# Patient Record
Sex: MALE | Race: BLACK OR AFRICAN AMERICAN | NOT HISPANIC OR LATINO | URBAN - METROPOLITAN AREA
[De-identification: names, ages, dates, MRNs, and addresses within clinical notes are randomized per-mention and may not be internally consistent; named-entity substitution may affect disease eponyms.]

---

## 2022-09-07 ENCOUNTER — HOSPITAL ENCOUNTER (EMERGENCY)
Facility: HOSPITAL | Age: 32
Discharge: HOME | End: 2022-09-07
Attending: EMERGENCY MEDICINE

## 2022-09-07 VITALS
RESPIRATION RATE: 20 BRPM | BODY MASS INDEX: 25.38 KG/M2 | SYSTOLIC BLOOD PRESSURE: 142 MMHG | WEIGHT: 187.39 LBS | DIASTOLIC BLOOD PRESSURE: 89 MMHG | HEIGHT: 72 IN | HEART RATE: 87 BPM | TEMPERATURE: 97.8 F | OXYGEN SATURATION: 99 %

## 2022-09-07 DIAGNOSIS — K08.89 PAIN, DENTAL: Primary | ICD-10-CM

## 2022-09-07 PROCEDURE — 99282 EMERGENCY DEPT VISIT SF MDM: CPT

## 2022-09-07 PROCEDURE — 99282 EMERGENCY DEPT VISIT SF MDM: CPT | Performed by: PHYSICIAN ASSISTANT

## 2022-09-07 NOTE — Clinical Note
Contreras Padmaja was seen and treated in our emergency department on 9/7/2022  Diagnosis:     Yue Woodward  may return to work on return date  He may return on this date: 09/08/2022         If you have any questions or concerns, please don't hesitate to call        Latoya Santiago PA-C    ______________________________           _______________          _______________  Hospital Representative                              Date                                Time
Kamilla Garcia was seen and treated in our emergency department on 9/7/2022  Diagnosis:     Caroline Liu  may return to work on return date  He may return on this date: 09/08/2022         If you have any questions or concerns, please don't hesitate to call        Katheryn Sierra PA-C    ______________________________           _______________          _______________  Hospital Representative                              Date                                Time
Statement Selected
Statement Selected

## 2022-09-07 NOTE — ED PROVIDER NOTES
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 [employer]  Patient doesn't 'have any dental insurance  +smoker  No daily meds except motrin for pain control  None       History reviewed  No pertinent past medical history  History reviewed  No pertinent surgical history  History reviewed  No pertinent family history  I have reviewed and agree with the history as documented  E-Cigarette/Vaping    E-Cigarette Use Never User      E-Cigarette/Vaping Substances     Social History     Tobacco Use    Smoking status: Current Every Day Smoker     Types: Cigarettes   Vaping Use    Vaping Use: Never used   Substance Use Topics    Alcohol use: Never    Drug use: Yes     Types: Marijuana       Review of Systems   Constitutional: Negative for appetite change, fatigue and fever  HENT: Positive for dental problem  Negative for ear pain, hearing loss, sore throat, tinnitus, trouble swallowing and voice change  Eyes: Negative for visual disturbance  Respiratory: Negative for cough and shortness of breath  Gastrointestinal: Negative for abdominal pain and vomiting  Skin: Negative for wound  Allergic/Immunologic: Negative for immunocompromised state  Neurological: Positive for headaches  Negative for dizziness, syncope and weakness  All other systems reviewed and are negative  Physical Exam  Physical Exam  Vitals and nursing note reviewed  Constitutional:       General: He is not in acute distress  Appearance: He is well-developed   He is not ill-appearing, toxic-appearing or diaphoretic  HENT:      Head: Normocephalic and atraumatic  Jaw: There is normal jaw occlusion  No trismus or pain on movement  Right Ear: Tympanic membrane, ear canal and external ear normal       Left Ear: Tympanic membrane, ear canal and external ear normal       Nose: Nose normal       Mouth/Throat:      Lips: Pink  Mouth: Mucous membranes are moist       Dentition: Abnormal dentition  Dental caries present  No gingival swelling, dental abscesses or gum lesions  Tongue: No lesions  Tongue does not deviate from midline  Pharynx: Oropharynx is clear  Uvula midline  No pharyngeal swelling, posterior oropharyngeal erythema or uvula swelling  Tonsils: No tonsillar exudate  0 on the right  0 on the left  Comments: Broken tooth with black dental caries  Lips: slight hypopigmentation lower lip  Normal phonation  Eyes:      Extraocular Movements: Extraocular movements intact  Conjunctiva/sclera: Conjunctivae normal    Cardiovascular:      Rate and Rhythm: Normal rate and regular rhythm  Heart sounds: Normal heart sounds  Pulmonary:      Effort: Pulmonary effort is normal       Breath sounds: Normal breath sounds  Abdominal:      General: Bowel sounds are normal       Palpations: Abdomen is soft  Tenderness: There is no abdominal tenderness  Musculoskeletal:      Cervical back: Normal range of motion and neck supple  Skin:     General: Skin is warm and dry  Neurological:      Mental Status: He is alert and oriented to person, place, and time     Psychiatric:         Behavior: Behavior normal          Vital Signs  ED Triage Vitals [09/07/22 1337]   Temperature Pulse Respirations Blood Pressure SpO2   97 8 °F (36 6 °C) 87 20 142/89 99 %      Temp src Heart Rate Source Patient Position - Orthostatic VS BP Location FiO2 (%)   -- -- -- -- --      Pain Score       3           Vitals:    09/07/22 1337   BP: 142/89   Pulse: 87         Visual Acuity      ED Medications  Medications - No data to display    Diagnostic Studies  Results Reviewed     None                 No orders to display              Procedures  Procedures         ED Course           MDM    Disposition  Final diagnoses:   Pain, dental     Time reflects when diagnosis was documented in both MDM as applicable and the Disposition within this note     Time User Action Codes Description Comment    9/7/2022  3:07 PM Veleta Perches Add [K08 89] Pain, dental     9/7/2022  3:07 PM Veleta Perches Add [R68 84] Jaw pain, non-TMJ     9/7/2022  3:07 PM Veleta Perches Remove [R68 84] Jaw pain, non-TMJ       ED Disposition     ED Disposition   Discharge    Condition   Stable    Date/Time   Wed Sep 7, 2022  3:07 PM    [address] discharge to home/self care  Follow-up Information     Follow up With Specialties Details Why Contact Info Additional [address] and [address]  In 1 week As needed [address] Dentistry In 1 week  [facility] [address]  [address] [place], [place], [address], [phone]          Patient's Medications    No medications on file       No discharge procedures on file      PDMP Review     None          ED Provider  Electronically Signed by           Li Sanon PA-C  09/07/22 0779

## 2022-12-14 ENCOUNTER — HOSPITAL ENCOUNTER (EMERGENCY)
Facility: HOSPITAL | Age: 32
Discharge: HOME | End: 2022-12-14
Attending: EMERGENCY MEDICINE

## 2022-12-14 VITALS
RESPIRATION RATE: 18 BRPM | DIASTOLIC BLOOD PRESSURE: 80 MMHG | HEART RATE: 72 BPM | OXYGEN SATURATION: 98 % | TEMPERATURE: 98.6 F | SYSTOLIC BLOOD PRESSURE: 146 MMHG

## 2022-12-14 DIAGNOSIS — H81.10 BPPV (BENIGN PAROXYSMAL POSITIONAL VERTIGO): Primary | ICD-10-CM

## 2022-12-14 RX ORDER — FLUTICASONE PROPIONATE 50 MCG
1 SPRAY, SUSPENSION (ML) NASAL DAILY
Qty: 16 G | Refills: 0 | Status: SHIPPED | OUTPATIENT
Start: 2022-12-14

## 2022-12-14 RX ORDER — MECLIZINE HYDROCHLORIDE 25 MG/1
25 TABLET ORAL 3 TIMES DAILY PRN
Qty: 30 TABLET | Refills: 0 | Status: SHIPPED | OUTPATIENT
Start: 2022-12-14

## 2022-12-14 RX ORDER — MECLIZINE HYDROCHLORIDE 25 MG/1
25 TABLET ORAL ONCE
Status: COMPLETED | OUTPATIENT
Start: 2022-12-14 | End: 2022-12-14

## 2022-12-14 RX ADMIN — DEXAMETHASONE 10 MG: 2 TABLET ORAL at 14:33

## 2022-12-14 RX ADMIN — MECLIZINE HYDROCHLORIDE 25 MG: 25 TABLET ORAL at 14:15

## 2022-12-14 NOTE — Clinical Note
Baylee Luevano was seen and treated in our emergency department on 12/14/2022  Diagnosis:     Melo Laurent    He may return on this date: 12/17/2022         If you have any questions or concerns, please don't hesitate to call        Sujatha Phillip MD    ______________________________           _______________          _______________  Hospital Representative                              Date                                Time

## 2022-12-14 NOTE — ED ATTENDING ATTESTATION
12/14/2022  INeo MD, saw and evaluated the patient  I have discussed the patient with the resident/non-physician practitioner and agree with the resident's/non-physician practitioner's findings, Plan of Care, and MDM as documented in the resident's/non-physician practitioner's note, except where noted  All available labs and Radiology studies were reviewed  I was present for key portions of any procedure(s) performed by the resident/non-physician practitioner and I was immediately available to provide assistance  At this point I agree with the current assessment done in the Emergency Department  I have conducted an independent evaluation of this patient a history and physical is as follows:    27-year-old man presenting with vertigo which happens only with positional change  No recent illnesses  No headache  Exam was significant for bilateral serous otitis media, no focal neurologic deficit  Will treat symptomatically and refer for ongoing care      ED Course         Critical Care Time  Procedures

## 2022-12-14 NOTE — DISCHARGE INSTRUCTIONS
Take the meclizine and Flonase for your dizziness  You can continue to do the Epley Maneuvers to help your symptoms  If your symptoms do not improve, please follow up with the physical therapists  If you have any numbness, weakness, vision changes, facial droop, slurred speech, or any other new or worsening symptoms, please return to your nearest ER

## 2022-12-14 NOTE — ED PROVIDER NOTES
History  Chief Complaint   Patient presents with   • Medical Problem     Pt stated that he feels like he has a concussion  Denies any head injury or trauma   + dizziness  Normal appetite  - illness recently  + nausea when dizzy     Korina Lujan is a 20-year-old man with no significant medical history presenting with vertigo  Started about 3 days ago  It is improving  It only occurs when he moves his head in certain directions  He gets nauseous when he experiences the vertigo  He states that it feels similar to when he had a concussion before, however reports no recent head strikes  He has not taken anything for the symptoms  No recent illnesses, numbness, weakness, vision changes, fevers, chills  None       History reviewed  No pertinent past medical history  History reviewed  No pertinent surgical history  History reviewed  No pertinent family history  I have reviewed and agree with the history as documented  E-Cigarette/Vaping   • E-Cigarette Use Never User      E-Cigarette/Vaping Substances     Social History     Tobacco Use   • Smoking status: Every Day     Types: Cigarettes   Vaping Use   • Vaping Use: Never used   Substance Use Topics   • Alcohol use: Never   • Drug use: Yes     Types: Marijuana        Review of Systems   All other systems reviewed and are negative  Physical Exam  ED Triage Vitals [12/14/22 1330]   Temperature Pulse Respirations Blood Pressure SpO2   98 6 °F (37 °C) 72 18 146/80 98 %      Temp Source Heart Rate Source Patient Position - Orthostatic VS BP Location FiO2 (%)   Tympanic -- -- -- --      Pain Score       --             Orthostatic Vital Signs  Vitals:    12/14/22 1330   BP: 146/80   Pulse: 72       Physical Exam  Vitals and nursing note reviewed  Constitutional:       General: He is not in acute distress  Appearance: He is well-developed  HENT:      Head: Normocephalic and atraumatic        Right Ear: Tympanic membrane and external ear normal       Left Ear: Tympanic membrane and external ear normal       Nose: Nose normal       Mouth/Throat:      Mouth: Mucous membranes are moist    Eyes:      Extraocular Movements: Extraocular movements intact  Conjunctiva/sclera: Conjunctivae normal       Pupils: Pupils are equal, round, and reactive to light  Cardiovascular:      Rate and Rhythm: Normal rate and regular rhythm  Pulmonary:      Effort: Pulmonary effort is normal  No respiratory distress  Breath sounds: Normal breath sounds  Abdominal:      General: There is no distension  Musculoskeletal:         General: No deformity  Cervical back: Normal range of motion  Skin:     General: Skin is warm and dry  Neurological:      General: No focal deficit present  Mental Status: He is alert and oriented to person, place, and time  Comments: CN2-12 intact  5/5 strength and normal sensation in all 4 extremities  Finger-nose normal   Heel-to-shin normal   Gait normal  Unable to perform HINTS exam as patient is not currently vertiginous  Visual fields intact  ED Medications  Medications   meclizine (ANTIVERT) tablet 25 mg (25 mg Oral Given 12/14/22 1415)   dexamethasone (DECADRON) tablet 10 mg (10 mg Oral Given 12/14/22 1433)       Diagnostic Studies  Results Reviewed     None                 No orders to display         Procedures  Procedures      ED Course                                       MDM  Number of Diagnoses or Management Options  BPPV (benign paroxysmal positional vertigo)  Diagnosis management comments: 34 yo man presenting with vertigo, appears to be BPPV based on history  No other neurological findings to suggest central cause  Will treat with meclizine  Refer to PT  Patient instructed on how to perform Epley maneuvers  Prescribed meclizine for home  Discharged home in stable condition, return precautions given        Disposition  Final diagnoses:   BPPV (benign paroxysmal positional vertigo) Time reflects when diagnosis was documented in both MDM as applicable and the Disposition within this note     Time User Action Codes Description Comment    12/14/2022  2:12 PM Poncho Rival Add [W98 56] BPPV (benign paroxysmal positional vertigo)       ED Disposition     ED Disposition   Discharge    Condition   Stable    Date/Time   Wed Dec 14, 2022  2:09 PM    [address] discharge to home/self care  Follow-up Information     Follow up With Specialties Details Why Contact Info Additional Information    Physical Therapy at [address] [place] [address] Physical Therapy   Aidan [address] 75  [phone] Physical Therapy at [address] [place] [address], [address]    [address] [place] [address] 34 [facility] Emergency Department Emergency Medicine  If symptoms worsen [address] [id number]  [address] [facility] 64 [facility] Emergency Department, [address], [place], [place], [address]          Discharge Medication List as of 12/14/2022  2:23 PM      START taking these medications    Details   fluticasone (FLONASE) 50 mcg/act nasal spray 1 spray into each nostril daily, Starting Wed 12/14/2022, Print      meclizine (ANTIVERT) 25 mg tablet Take 1 tablet (25 mg total) by mouth 3 (three) times a day as needed for dizziness, Starting Wed 12/14/2022, Print               PDMP Review     None           ED Provider  Attending physically available and evaluated Obie Castleman I managed the patient along with the ED Attending      Electronically Signed by         Beto Pereira MD  12/14/22 [address], [place]  12/17/22 0123

## 2023-09-25 ENCOUNTER — HOSPITAL ENCOUNTER (EMERGENCY)
Facility: HOSPITAL | Age: 33
Discharge: HOME | End: 2023-09-25
Attending: EMERGENCY MEDICINE
Payer: COMMERCIAL

## 2023-09-25 VITALS
DIASTOLIC BLOOD PRESSURE: 66 MMHG | OXYGEN SATURATION: 100 % | HEART RATE: 72 BPM | SYSTOLIC BLOOD PRESSURE: 137 MMHG | TEMPERATURE: 97.6 F | RESPIRATION RATE: 16 BRPM

## 2023-09-25 DIAGNOSIS — R21 SKIN RASH: Primary | ICD-10-CM

## 2023-09-25 PROCEDURE — 99284 EMERGENCY DEPT VISIT MOD MDM: CPT | Performed by: EMERGENCY MEDICINE

## 2023-09-25 PROCEDURE — 99282 EMERGENCY DEPT VISIT SF MDM: CPT

## 2023-09-25 RX ORDER — PREDNISONE 20 MG/1
40 TABLET ORAL DAILY
Qty: 10 TABLET | Refills: 0 | Status: SHIPPED | OUTPATIENT
Start: 2023-09-25 | End: 2023-09-30

## 2023-09-25 NOTE — Clinical Note
Lucero Lozanont was seen and treated in our emergency department on 9/25/2023. No restrictions            Diagnosis:     Jovan De La Cruz  may return to work on return date. He may return on this date: 09/26/2023         If you have any questions or concerns, please don't hesitate to call.       Linette Acuña MD    ______________________________           _______________          _______________  Hospital Representative                              Date                                Time

## 2023-09-25 NOTE — ED ATTENDING ATTESTATION
Aubrie Gregorio MD, saw and evaluated the patient. I have discussed the patient with the resident and agree with the resident's findings, Plan of Care, and MDM as documented in the resident's note, except where noted. All available labs and Radiology studies were reviewed. I was present for key portions of any procedure(s) performed by the resident and I was immediately available to provide assistance. At this point I agree with the current assessment done in the Emergency Department. I have conducted an independent evaluation of this patient a history and physical is as follows:    30 yo male with no significant past medical history presents to the ED complaining of a diffuse rash x 4-5 months. The patient says he developed the rash in the late spring/early summer. He was evaluated at an outside hospital and diagnosed with eczema. He was prescribed a topical low-dose steroid but hasn't experienced much relief. (+) Scattered circular pruritic erythematous/scaling lesions to all 4 extremities and scalp. The rash is worsened by wearing tight closes. No fevers or chills. He has not seen a dermatologist and does not have a PCP. No chest pain, shortness of breath, cough, nausea, or vomiting. No other specific complaints. ROS: per resident physician note    Gen: NAD, AA&Ox3  HEENT: PERRL, EOMI  Neck: supple  CV: RRR  Lungs: CTA B/L  Abdomen: soft, NT/ND  Ext: no swelling or deformity  Neuro: 5/5 strength all extremities, sensation grossly intact  Skin: (+) small erythematous circular plaques with white scaling to extremities and scalp, no palm/sole involvement, no mm lesions    ED Course  The patient is comfortable appearing with stable vital signs. Rash has been present for 4 months and has not significantly worsened. Psoriasis vs eczema vs contact dermatitis? Plan for a course of oral steroids and close follow up with Dermatology for further workup/management. Ambulatory referral to Dermatology entered. The patient is agreeable to this plan. Strict return precautions and work note provided.       Critical Care Time  Procedures

## 2023-09-26 NOTE — ED PROVIDER NOTES
Patient is a 69-year-old male history  Chief Complaint   Patient presents with   • Skin Problem     C/o of eczema like patches all over his body, started beginning of the summer. Went to [employer] and was diagnosed with eczema     Patient is an otherwise healthy 69-year-old male who presents for evaluation of worsening rash. Patient states that symptoms began over the summer. He was seen and evaluated at [facility] (unable to confirm in chart) and diagnosed with eczema. He has had progressive worsening of itching, and spread of rash throughout entire body surface. Patient states he has been utilizing topical steroid cream without improvement of symptoms. Patient states that rash is irritated by wearing his work uniform, and often skin sticks to his uniform. Upon removing uniform, patient notices bleeding at sites of affected skin. Denying any other complaints at this time. Has not yet been seen by a dermatologist for this issue. Prior to Admission Medications   Prescriptions Last Dose Informant Patient Reported? Taking?   fluticasone (FLONASE) 50 mcg/act nasal spray   No No   Si spray into each nostril daily   meclizine (ANTIVERT) 25 mg tablet   No No   Sig: Take 1 tablet (25 mg total) by mouth 3 (three) times a day as needed for dizziness      Facility-Administered Medications: None       History reviewed. No pertinent past medical history. History reviewed. No pertinent surgical history. History reviewed. No pertinent family history. I have reviewed and agree with the history as documented. E-Cigarette/Vaping   • E-Cigarette Use Never User      E-Cigarette/Vaping Substances     Social History     Tobacco Use   • Smoking status: Every Day     Types: Cigarettes   Vaping Use   • Vaping Use: Never used   Substance Use Topics   • Alcohol use: Never   • Drug use: Yes     Types: Marijuana        Review of Systems   Constitutional: Negative for chills and fever.    Respiratory: Negative for shortness of breath. Cardiovascular: Negative for chest pain. Gastrointestinal: Negative for abdominal pain, nausea and vomiting. Genitourinary: Negative for difficulty urinating. Skin: Positive for rash. Neurological: Negative for headaches. Physical Exam  ED Triage Vitals [09/25/23 1646]   Temperature Pulse Respirations Blood Pressure SpO2   97.6 °F (36.4 °C) 72 16 137/66 100 %      Temp Source Heart Rate Source Patient Position - Orthostatic VS BP Location FiO2 (%)   Temporal Monitor Sitting Left arm --      Pain Score       No Pain             Orthostatic Vital Signs  Vitals:    09/25/23 1646   BP: 137/66   Pulse: 72   Patient Position - Orthostatic VS: Sitting       Physical Exam  Constitutional:       General: He is not in acute distress. Appearance: Normal appearance. He is normal weight. He is not ill-appearing, toxic-appearing or diaphoretic. HENT:      Head: Normocephalic and atraumatic. Cardiovascular:      Rate and Rhythm: Normal rate and regular rhythm. Heart sounds: Normal heart sounds. Pulmonary:      Effort: Pulmonary effort is normal.      Breath sounds: Normal breath sounds. Abdominal:      General: Abdomen is flat. Palpations: Abdomen is soft. Tenderness: There is no abdominal tenderness. Musculoskeletal:         General: Normal range of motion. Skin:     General: Skin is warm and dry. Capillary Refill: Capillary refill takes less than 2 seconds. Findings: Rash (small patches and plaques observed over entire body surface including scalp. Sparing palms. ) present. Neurological:      General: No focal deficit present. Mental Status: He is alert. Psychiatric:         Mood and Affect: Mood normal.         Behavior: Behavior normal.         Thought Content:  Thought content normal.         Judgment: Judgment normal.         ED Medications  Medications - No data to display    Diagnostic Studies  Results Reviewed     None No orders to display         Procedures  Procedures      ED Course                             SBIRT 20yo+    Flowsheet Row Most Recent Value   Initial Alcohol Screen: US AUDIT-C     1. How often do you have a drink containing alcohol? 0 Filed at: 09/25/2023 1924   2. How many drinks containing alcohol do you have on a typical day you are drinking? 0 Filed at: 09/25/2023 1924   3a. Male UNDER 65: How often do you have five or more drinks on one occasion? 0 Filed at: 09/25/2023 1924   Audit-C Score 0 Filed at: 09/25/2023 1924   JUNO: How many times in the past year have you. .. Used an illegal drug or used a prescription medication for non-medical reasons? Never Filed at: 09/25/2023 [address] Alex Capps is a 29 y.o. who presents with complaints of worsening skin rash. Diagnosed with eczema this summer at [facility]. Vital signs are stable, afebrile  PE: diffuse scales and plaques observed on body surface, see media for image of rash     Ddx: psoriasis vs. Eczema vs. Other dermatologic condition    Plan: Will provide dermatology referral and 5 day course of prednisone    Disposition: Patient stable for discharge home. Return precautions provided. Patient understands and agreeable to plan. Risk  Prescription drug management. Disposition  Final diagnoses:   Skin rash     Time reflects when diagnosis was documented in both MDM as applicable and the Disposition within this note     Time User Action Codes Description Comment    9/25/2023  7:14 PM Phu Osorio Add [R21] Skin rash       ED Disposition     ED Disposition   Discharge    Condition   Stable    Date/Time   Mon Sep 25, 2023  7:14 PM    [address] [facility] discharge to home/self care.                Follow-up Information     Follow up With Specialties Details Why [address] [facility] Internal Medicine Schedule an appointment as soon as possible for a visit in 1 month to establish care [address] [facility] [address] 5446 [facility], [address] [place] [address], [place], [place], [address] [phone]          Discharge Medication List as of 9/25/2023  7:16 PM      START taking these medications    Details   predniSONE 20 mg tablet Take 2 tablets (40 mg total) by mouth daily for 5 days, Starting Mon 9/25/2023, Until Sat 9/30/2023, Print         CONTINUE these medications which have NOT CHANGED    Details   fluticasone (FLONASE) 50 mcg/act nasal spray 1 spray into each nostril daily, Starting Wed 12/14/2022, Print      meclizine (ANTIVERT) 25 mg tablet Take 1 tablet (25 mg total) by mouth 3 (three) times a day as needed for dizziness, Starting Wed 12/14/2022, Print               PDMP Review     None           ED Provider  Attending physically available and evaluated Barbara Rachel. I managed the patient along with the ED Attending.     Electronically Signed by         Jose Rao MD  09/26/23 3090

## 2023-10-24 ENCOUNTER — APPOINTMENT (EMERGENCY)
Dept: RADIOLOGY | Facility: HOSPITAL | Age: 33
End: 2023-10-24
Payer: OTHER MISCELLANEOUS

## 2023-10-24 ENCOUNTER — HOSPITAL ENCOUNTER (EMERGENCY)
Facility: HOSPITAL | Age: 33
Discharge: HOME | End: 2023-10-24
Attending: EMERGENCY MEDICINE
Payer: OTHER MISCELLANEOUS

## 2023-10-24 VITALS
RESPIRATION RATE: 18 BRPM | WEIGHT: 182 LBS | HEART RATE: 71 BPM | OXYGEN SATURATION: 100 % | HEIGHT: 72 IN | DIASTOLIC BLOOD PRESSURE: 91 MMHG | SYSTOLIC BLOOD PRESSURE: 142 MMHG | BODY MASS INDEX: 24.65 KG/M2 | TEMPERATURE: 98.2 F

## 2023-10-24 DIAGNOSIS — S61.209A AVULSION OF FINGERTIP, INITIAL ENCOUNTER: Primary | ICD-10-CM

## 2023-10-24 DIAGNOSIS — S61.217A LACERATION OF LEFT LITTLE FINGER: ICD-10-CM

## 2023-10-24 PROCEDURE — 73140 X-RAY EXAM OF FINGER(S): CPT

## 2023-10-24 PROCEDURE — 90471 IMMUNIZATION ADMIN: CPT

## 2023-10-24 PROCEDURE — 99283 EMERGENCY DEPT VISIT LOW MDM: CPT

## 2023-10-24 PROCEDURE — 90715 TDAP VACCINE 7 YRS/> IM: CPT | Performed by: EMERGENCY MEDICINE

## 2023-10-24 PROCEDURE — 99285 EMERGENCY DEPT VISIT HI MDM: CPT | Performed by: EMERGENCY MEDICINE

## 2023-10-24 PROCEDURE — 64450 NJX AA&/STRD OTHER PN/BRANCH: CPT | Performed by: EMERGENCY MEDICINE

## 2023-10-24 RX ORDER — CEPHALEXIN 250 MG/1
500 CAPSULE ORAL ONCE
Status: COMPLETED | OUTPATIENT
Start: 2023-10-24 | End: 2023-10-24

## 2023-10-24 RX ORDER — OXYCODONE HYDROCHLORIDE 5 MG/1
5 TABLET ORAL EVERY 6 HOURS PRN
Qty: 5 TABLET | Refills: 0 | Status: SHIPPED | OUTPATIENT
Start: 2023-10-24

## 2023-10-24 RX ORDER — IBUPROFEN 600 MG/1
600 TABLET ORAL EVERY 6 HOURS PRN
Qty: 30 TABLET | Refills: 0 | Status: SHIPPED | OUTPATIENT
Start: 2023-10-24

## 2023-10-24 RX ORDER — CEPHALEXIN 500 MG/1
500 CAPSULE ORAL EVERY 6 HOURS SCHEDULED
Qty: 28 CAPSULE | Refills: 0 | Status: SHIPPED | OUTPATIENT
Start: 2023-10-24 | End: 2023-10-31

## 2023-10-24 RX ORDER — LIDOCAINE HYDROCHLORIDE 20 MG/ML
5 INJECTION, SOLUTION EPIDURAL; INFILTRATION; INTRACAUDAL; PERINEURAL ONCE
Status: COMPLETED | OUTPATIENT
Start: 2023-10-24 | End: 2023-10-24

## 2023-10-24 RX ADMIN — CEPHALEXIN 500 MG: 250 CAPSULE ORAL at 21:18

## 2023-10-24 RX ADMIN — TETANUS TOXOID, REDUCED DIPHTHERIA TOXOID AND ACELLULAR PERTUSSIS VACCINE, ADSORBED 0.5 ML: 5; 2.5; 8; 8; 2.5 SUSPENSION INTRAMUSCULAR at 20:19

## 2023-10-24 RX ADMIN — LIDOCAINE HYDROCHLORIDE 5 ML: 20 INJECTION, SOLUTION EPIDURAL; INFILTRATION; INTRACAUDAL; PERINEURAL at 19:59

## 2023-10-24 NOTE — ED PROVIDER NOTES
History  Chief Complaint   Patient presents with    Finger Laceration     Left 5th digit laceration that occurred when a rack holding metal parts fell on patient's hand around 7 pm tonight     59-year-old male presents to the emergency department for evaluation of a finger laceration. The patient reports just prior to arrival he was at work and a metal rack fell cutting his left fifth digit. The patient states that the laceration is to the tip of his left little finger and he immediately started bleeding. He reports putting pressure on the area and having a supervisor from work drive him here to the emergency department for further evaluation. The patient denies any other injuries. He is unsure of when his last tetanus immunization was. He denies any localized numbness, tingling or weakness. None       History reviewed. No pertinent past medical history. History reviewed. No pertinent surgical history. History reviewed. No pertinent family history. I have reviewed and agree with the history as documented. E-Cigarette/Vaping    E-Cigarette Use Never User      E-Cigarette/Vaping Substances     Social History     Tobacco Use    Smoking status: Every Day     Packs/day: 0.25     Types: Cigarettes   Vaping Use    Vaping Use: Never used   Substance Use Topics    Alcohol use: Never    Drug use: Yes     Types: Marijuana       Review of Systems   Constitutional:  Negative for chills and fever. HENT:  Negative for ear pain and sore throat. Eyes:  Negative for pain and visual disturbance. Respiratory:  Negative for cough and shortness of breath. Cardiovascular:  Negative for chest pain and palpitations. Gastrointestinal:  Negative for abdominal pain and vomiting. Genitourinary:  Negative for dysuria and hematuria. Musculoskeletal:  Negative for arthralgias and back pain. Skin:  Positive for wound. Negative for color change and rash. Neurological:  Negative for seizures and syncope. All other systems reviewed and are negative. Physical Exam  Physical Exam  Vitals and nursing note reviewed. Constitutional:       General: He is not in acute distress. Appearance: He is well-developed. HENT:      Head: Normocephalic and atraumatic. Eyes:      Conjunctiva/sclera: Conjunctivae normal.   Cardiovascular:      Rate and Rhythm: Normal rate and regular rhythm. Heart sounds: No murmur heard. Pulmonary:      Effort: Pulmonary effort is normal. No respiratory distress. Breath sounds: Normal breath sounds. Abdominal:      Palpations: Abdomen is soft. Tenderness: There is no abdominal tenderness. Musculoskeletal:         General: Tenderness, deformity and signs of injury present. Right wrist: Normal.      Left wrist: Normal.      Right hand: Normal.      Left hand: Laceration present. Hands:       Cervical back: Neck supple. Skin:     General: Skin is warm and dry. Capillary Refill: Capillary refill takes less than 2 seconds. Findings: Laceration present. Neurological:      Mental Status: He is alert.    Psychiatric:         Mood and Affect: Mood normal.             Vital Signs  ED Triage Vitals   Temperature Pulse Respirations Blood Pressure SpO2   10/24/23 1942 10/24/23 1935 10/24/23 1935 10/24/23 1935 10/24/23 1935   98.2 °F (36.8 °C) 71 18 142/91 100 %      Temp Source Heart Rate Source Patient Position - Orthostatic VS BP Location FiO2 (%)   10/24/23 1942 10/24/23 1935 10/24/23 1935 10/24/23 1935 --   Oral Monitor Sitting Right arm       Pain Score       10/24/23 1935       2           Vitals:    10/24/23 1935   BP: 142/91   Pulse: 71   Patient Position - Orthostatic VS: Sitting         Visual Acuity      ED Medications  Medications   lidocaine (PF) (XYLOCAINE-MPF) 2 % injection 5 mL (5 mL Infiltration Given 10/24/23 1959)   tetanus-diphtheria-acellular pertussis (BOOSTRIX) IM injection 0.5 mL (0.5 mL Intramuscular Given 10/24/23 2019) cephalexin (KEFLEX) capsule 500 mg (500 mg Oral Given 10/24/23 2118)       Diagnostic Studies  Results Reviewed       None                   XR finger fifth digit-pinkie LEFT   ED Interpretation by Rosalio Nesbitt MD (10/24 2028)   Left fifth digit fingertip avulsion injury with fracture                 Procedures  Digital Block    Date/Time: 10/24/2023 8:30 PM    Performed by: Rosalio Nesbitt MD  Authorized by: Rosalio Nesbitt MD    Consent:     Consent obtained:  Verbal    Consent given by:  Patient    Risks discussed: Allergic reaction, infection, nerve damage, bleeding, intravascular injection, pain, unsuccessful block and swelling    Alternatives discussed:  No treatment  Indications:     Indications:  Pain relief  Location:     Block location:  Finger    Finger blocked:  L little finger  Pre-procedure details:     Neurovascular status: intact      Skin preparation:  Alcohol  Procedure details (see MAR for exact dosages):     Needle gauge:  25 G    Anesthetic injected:  Lidocaine 2% w/o epi    Technique:  Four-sided ring block    Injection procedure:  Anatomic landmarks identified, incremental injection, negative aspiration for blood, anatomic landmarks palpated and introduced needle  Post-procedure details:     Outcome:  Pain relieved    Patient tolerance of procedure: Tolerated well, no immediate complications           ED Course  ED Course as of 10/25/23 0018   e Oct 24, 2023   2025 Tiger text sent to the on-call hand surgeon, Dr. Corinne Fallow. 2032 In addition to the tetanus shot and bulky dressing that was already applied the orthopedic hand surgeon recommended starting the patient on a course of Keflex and having the patient follow-up with hand surgery. SBIRT 22yo+      Flowsheet Row Most Recent Value   Initial Alcohol Screen: US AUDIT-C     1.  How often do you have a drink containing alcohol? 0 Filed at: 10/24/2023 1938   Audit-C Score 0 Filed at: 10/24/2023 4917 JUNO: How many times in the past year have you. .. Used an illegal drug or used a prescription medication for non-medical reasons? Never Filed at: 10/24/2023 1938                      Medical Decision Making  56-year-old male presented to the emergency department for evaluation of a finger laceration. Physical exam was consistent with a left fifth finger fingertip avulsion injury. Imaging done in the emergency department on my interpretation was consistent with an avulsion injury with fracture. The case was discussed with the orthopedic hand surgeon on-call who recommended starting the patient on a course of Keflex and having him follow-up with hand surgery outpatient. A digital block was performed and the patient was placed in a bulky dressing. Bleeding was controlled. The patient was given a tetanus shot and provided with his first dose of Keflex here in the emergency department and he was given a prescription for a 7-day course. The patient was also given a prescription for Motrin and oxycodone. An ambulatory referral to orthopedic hand surgery was placed. The patient was provided with follow-up information. Patient agrees with the plan for discharge and feels comfortable to go home with proper f/u. Advised to return for worsening or additional problems. Diagnostic tests were reviewed and questions answered. Diagnosis, care plan and treatment options were discussed. The patient understands instructions and will follow up as directed. Amount and/or Complexity of Data Reviewed  Radiology: ordered and independent interpretation performed. Risk  Prescription drug management.              Disposition  Final diagnoses:   Avulsion of fingertip, initial encounter   Laceration of left little finger     Time reflects when diagnosis was documented in both MDM as applicable and the Disposition within this note       Time User Action Codes Description Comment    10/24/2023  9:06 PM Mayme Krabbe Add [S61.209A] Avulsion of fingertip, initial encounter     10/24/2023  9:06 PM Aleene Memory Add [V99.615R] Laceration of left little finger           ED Disposition       ED Disposition   Discharge    Condition   Stable    Date/Time   Tue Oct 24, 2023 2106    Comment   Roberto Carlos Quivers discharge to home/self care.                    Follow-up Information       Follow up With Specialties Details Why Contact Info Additional Information    Carroll Ambrose MD Orthopedic Surgery, Hand Surgery Schedule an appointment as soon as possible for a visit   [address] [phone]       [address] [facility] Emergency Department Emergency Medicine Go to  If symptoms worsen [address] [phone]  [address] [facility] Emergency Department, [address] [place] [address], [address] [place] [address]            Discharge Medication List as of 10/24/2023  9:11 PM        START taking these medications    Details   cephalexin (KEFLEX) 500 mg capsule Take 1 capsule (500 mg total) by mouth every 6 (six) hours for 7 days, Starting Tue 10/24/2023, Until Tue 10/31/2023, Normal      ibuprofen (MOTRIN) 600 mg tablet Take 1 tablet (600 mg total) by mouth every 6 (six) hours as needed for mild pain or moderate pain, Starting Tue 10/24/2023, Normal      oxyCODONE (Roxicodone) 5 immediate release tablet Take 1 tablet (5 mg total) by mouth every 6 (six) hours as needed for moderate pain for up to 5 doses Max Daily Amount: 20 mg, Starting Tue 10/24/2023, Normal                 PDMP Review       None            ED Provider  Electronically Signed by             Noman Rosenbaum MD  10/25/23 0056

## 2023-10-25 ENCOUNTER — TELEPHONE (OUTPATIENT)
Dept: OBGYN CLINIC | Facility: CLINIC | Age: 33
End: 2023-10-25

## 2023-10-25 NOTE — TELEPHONE ENCOUNTER
----- Message from John Stephens MD sent at 10/24/2023  8:34 PM EDT -----  Please add on with hand surgery for finger tip amputation. If seeing me, please schedule before Tuesday morning. Thank you.

## 2023-10-26 NOTE — TELEPHONE ENCOUNTER
Caller: Wife    Doctor: Hand    Reason for call: Spoke with Chacha Barragan and she will add him to 10/30/2023.      Call back#: n/a

## 2023-10-27 ENCOUNTER — HOSPITAL ENCOUNTER (EMERGENCY)
Facility: HOSPITAL | Age: 33
Discharge: HOME | End: 2023-10-27
Attending: EMERGENCY MEDICINE
Payer: OTHER MISCELLANEOUS

## 2023-10-27 ENCOUNTER — TELEPHONE (OUTPATIENT)
Dept: OBGYN CLINIC | Facility: CLINIC | Age: 33
End: 2023-10-27

## 2023-10-27 VITALS
SYSTOLIC BLOOD PRESSURE: 143 MMHG | OXYGEN SATURATION: 98 % | TEMPERATURE: 97 F | DIASTOLIC BLOOD PRESSURE: 91 MMHG | RESPIRATION RATE: 17 BRPM | HEART RATE: 79 BPM

## 2023-10-27 DIAGNOSIS — K08.89 PAIN, DENTAL: ICD-10-CM

## 2023-10-27 DIAGNOSIS — S69.90XA FINGER INJURY: ICD-10-CM

## 2023-10-27 DIAGNOSIS — S62.639A CLOSED FRACTURE OF TUFT OF DISTAL PHALANX OF FINGER: Primary | ICD-10-CM

## 2023-10-27 PROCEDURE — 99284 EMERGENCY DEPT VISIT MOD MDM: CPT | Performed by: EMERGENCY MEDICINE

## 2023-10-27 PROCEDURE — 99282 EMERGENCY DEPT VISIT SF MDM: CPT

## 2023-10-27 RX ORDER — OXYCODONE HYDROCHLORIDE 5 MG/1
5 TABLET ORAL EVERY 4 HOURS PRN
Qty: 6 TABLET | Refills: 0 | Status: SHIPPED | OUTPATIENT
Start: 2023-10-27

## 2023-10-27 NOTE — ED ATTENDING ATTESTATION
10/27/2023  IJaspal MD, saw and evaluated the patient. I have discussed the patient with the resident/non-physician practitioner and agree with the resident's/non-physician practitioner's findings, Plan of Care, and MDM as documented in the resident's/non-physician practitioner's note, except where noted. All available labs and Radiology studies were reviewed. I was present for key portions of any procedure(s) performed by the resident/non-physician practitioner and I was immediately available to provide assistance. At this point I agree with the current assessment done in the Emergency Department. I have conducted an independent evaluation of this patient a history and physical is as follows:    ED Course         Critical Care Time  Procedures    28 yo male crushed left fifth digit in a machine while at work few days ago, seen in [place] ed and given follow up, but not allowed to see due to work restrictions. Pt with continued pain in finger. Pt also with bottom left tooth pain. Pt taking ibuprofen. No facial swelling. No fever. Vss, afebrile, lungs cta, rrr, abdomen soft nontender, no facial swelling, tooth 18 cracked, tenderness, no abscess seen or felt. Pt finger wrapped and nvi.

## 2023-10-27 NOTE — TELEPHONE ENCOUNTER
Left voice mail message asking for patient to call back. Unable to leave message on [facility] voice mail is full. Need workman's comp information.

## 2023-10-27 NOTE — DISCHARGE INSTRUCTIONS
Please follow-up with hand surgery and dental clinic. Please follow-up with PCP and return to the ED with new or worsening symptoms-see attached. Pain medication was sent to your pharmacy. Continue taking Tylenol/Motrin for pain relief as well. Continue taking your antibiotic as prescribed.

## 2023-10-27 NOTE — ED PROVIDER NOTES
History  Chief Complaint   Patient presents with    Dental Pain     Pt reports left lower tooth abscess since Tuesday. Complains of pain at the site and sore throat. Pt taking ibuprofen for pain. Denies ABX use     Finger Injury     Pt injured right hand a few days ago and has not had follow up for it due to job discrepancy. Pt is here for follow up and ongoing pain issues with hand      Patient is a 36-year-old male presenting with finger pain and tooth pain. Patient states that he crushed his finger at work the other day and was told not to go to the hand surgeon by his job, so he ran out of pain medication and wanted his finger looked at for infection. Patient states that his finger he can wash the is in pain, but he denies any issues with it otherwise. Patient's main concern today was. Tooth pain. He states that he has a chronically cracked tooth that started hurting return in 3 days ago. He states that he had abscesses in his teeth previously to and it felt similar to this. He states that skin around the pain in the tooth has been progressively getting worse and he feels like his cheek is swollen. Patient denies fever, chills, diaphoresis. Patient denies any other symptoms including headache, lightheadedness, chest pain, shortness of breath, abdominal pain, nausea, vomiting, diarrhea, numbness, tingling, or weakness. Prior to Admission Medications   Prescriptions Last Dose Informant Patient Reported? Taking?    cephalexin (KEFLEX) 500 mg capsule   No No   Sig: Take 1 capsule (500 mg total) by mouth every 6 (six) hours for 7 days   ibuprofen (MOTRIN) 600 mg tablet   No No   Sig: Take 1 tablet (600 mg total) by mouth every 6 (six) hours as needed for mild pain or moderate pain   oxyCODONE (Roxicodone) 5 immediate release tablet   No No   Sig: Take 1 tablet (5 mg total) by mouth every 6 (six) hours as needed for moderate pain for up to 5 doses Max Daily Amount: 20 mg      Facility-Administered Medications: None       History reviewed. No pertinent past medical history. History reviewed. No pertinent surgical history. History reviewed. No pertinent family history. I have reviewed and agree with the history as documented. E-Cigarette/Vaping    E-Cigarette Use Never User      E-Cigarette/Vaping Substances     Social History     Tobacco Use    Smoking status: Former     Packs/day: 0.25     Types: Cigars, Cigarettes   Vaping Use    Vaping Use: Never used   Substance Use Topics    Alcohol use: Never    Drug use: Not Currently     Types: Marijuana        Review of Systems    Physical Exam  ED Triage Vitals [10/27/23 1246]   Temperature Pulse Respirations Blood Pressure SpO2   (!) 97 °F (36.1 °C) 79 17 143/91 98 %      Temp Source Heart Rate Source Patient Position - Orthostatic VS BP Location FiO2 (%)   Temporal Monitor -- Left arm --      Pain Score       7             Orthostatic Vital Signs  Vitals:    10/27/23 1246   BP: 143/91   Pulse: 79       Physical Exam  Vitals and nursing note reviewed. Constitutional:       General: He is not in acute distress. Appearance: Normal appearance. He is not ill-appearing, toxic-appearing or diaphoretic. HENT:      Head: Normocephalic and atraumatic. Mouth/Throat:      Mouth: Mucous membranes are moist.        Comments: Dental caries throughout the mouth  Cardiovascular:      Rate and Rhythm: Normal rate and regular rhythm. Heart sounds: Normal heart sounds. Pulmonary:      Effort: Pulmonary effort is normal. No respiratory distress. Breath sounds: Normal breath sounds. Abdominal:      General: Abdomen is flat. Palpations: Abdomen is soft. Musculoskeletal:         General: Signs of injury (Avulsion left fifth fingertip. No signs of infection) present. Normal range of motion. Cervical back: Normal range of motion and neck supple. Skin:     General: Skin is warm and dry. Findings: Rash (Diffuse psoriasis) present. Neurological:      General: No focal deficit present. Mental Status: He is alert and oriented to person, place, and time. ED Medications  Medications - No data to display    Diagnostic Studies  Results Reviewed       None                   No orders to display         Procedures  Procedures      ED Course                                       Medical Decision Making  Patient is a 78-year-old male presenting with finger pain and tooth pain. Patient was previously seen in the emergency department for a finger injury, bandage was removed to look for signs of infection which were not seen. New bandage applied. Patient complains of significant pain, so a few oxycodone were given to cover him for the weekend until he can follow-up with hand surgery. Patient complaining of tooth pain and has poor oral hygiene with multiple dental caries leading to concern for infection. Patient is already taking Keflex for his finger, so no new antibiotics were given for his tooth. No drainable abscess visible at this time. Patient was cleared for discharge with PCP and dental follow-up, and he was given return precautions. Risk  Prescription drug management. Disposition  Final diagnoses:   Closed fracture of tuft of distal phalanx of finger   Pain, dental   Finger injury     Time reflects when diagnosis was documented in both MDM as applicable and the Disposition within this note       Time User Action Codes Description Comment    10/27/2023  1:46 PM Dayana Terrazas Add [R55.825J] Closed fracture of tuft of distal phalanx of finger     10/27/2023  2:16 PM Sherry Subramanian Add [K08.89] Pain, dental     10/27/2023  2:16 PM Sherry Subramanian Add [S69.90XA] Finger injury           ED Disposition       ED Disposition   Discharge    Condition   Stable    Date/Time   Fri Oct 27, 2023  2:16 PM    [address] [place] [address] discharge to home/self care.                    Follow-up Information    None Discharge Medication List as of 10/27/2023  2:18 PM        START taking these medications    Details   !! oxyCODONE (Roxicodone) 5 immediate release tablet Take 1 tablet (5 mg total) by mouth every 4 (four) hours as needed for moderate pain Max Daily Amount: 30 mg, Starting Fri 10/27/2023, Normal       !! - Potential duplicate medications found. Please discuss with provider. CONTINUE these medications which have NOT CHANGED    Details   cephalexin (KEFLEX) 500 mg capsule Take 1 capsule (500 mg total) by mouth every 6 (six) hours for 7 days, Starting Tue 10/24/2023, Until Tue 10/31/2023, Normal      ibuprofen (MOTRIN) 600 mg tablet Take 1 tablet (600 mg total) by mouth every 6 (six) hours as needed for mild pain or moderate pain, Starting Tue 10/24/2023, Normal      !! oxyCODONE (Roxicodone) 5 immediate release tablet Take 1 tablet (5 mg total) by mouth every 6 (six) hours as needed for moderate pain for up to 5 doses Max Daily Amount: 20 mg, Starting Tue 10/24/2023, Normal       !! - Potential duplicate medications found. Please discuss with provider. PDMP Review       None             ED Provider  Attending physically available and evaluated Fadumo Katz. I managed the patient along with the ED Attending.     Electronically Signed by           Carrie Whitfield MD  10/29/23 6667

## 2023-10-31 ENCOUNTER — OFFICE VISIT (OUTPATIENT)
Dept: OBGYN CLINIC | Facility: CLINIC | Age: 33
End: 2023-10-31
Payer: OTHER MISCELLANEOUS

## 2023-10-31 ENCOUNTER — TELEPHONE (OUTPATIENT)
Dept: OBGYN CLINIC | Facility: CLINIC | Age: 33
End: 2023-10-31

## 2023-10-31 ENCOUNTER — OFFICE VISIT (OUTPATIENT)
Dept: OCCUPATIONAL THERAPY | Facility: CLINIC | Age: 33
End: 2023-10-31
Payer: OTHER MISCELLANEOUS

## 2023-10-31 VITALS
BODY MASS INDEX: 24.65 KG/M2 | HEIGHT: 72 IN | DIASTOLIC BLOOD PRESSURE: 70 MMHG | SYSTOLIC BLOOD PRESSURE: 150 MMHG | WEIGHT: 182 LBS

## 2023-10-31 DIAGNOSIS — S61.209A AVULSION OF FINGERTIP, INITIAL ENCOUNTER: ICD-10-CM

## 2023-10-31 DIAGNOSIS — S61.209S: Primary | ICD-10-CM

## 2023-10-31 PROCEDURE — L3933 FO W/O JOINTS CF: HCPCS | Performed by: OCCUPATIONAL THERAPIST

## 2023-10-31 PROCEDURE — 99213 OFFICE O/P EST LOW 20 MIN: CPT | Performed by: ORTHOPAEDIC SURGERY

## 2023-10-31 NOTE — TELEPHONE ENCOUNTER
Jose Parekh from [facility] adjustor requested today's office note, script and letter be faxed to [phone].  Faxed information received confirmation

## 2023-10-31 NOTE — PROGRESS NOTES
Assessment/Plan:  1. Avulsion of fingertip, initial encounter  Ambulatory Referral to Hand Surgery          Subjective history, physical examination performed, diagnostic imaging reviewed at today's visit  Diagnosis was discussed  Treatment options were discussed which included nonoperative and operative treatment. Risks, benefits, and realistic expectations for treatment options were discussed. The patient was given an opportunity to ask questions. Questions were answered to the patient's satisfaction. Through shared decision making, the patient decided to move forward with  local wound care and protective splint fabricated by hand therapist    Work restrictions:  must wear splint. No lifting more than 5 lbs with left hand. Keep finger clean and dry  Follow up in 2 weeks for clinical evaluation    Orthopedic Hand Surgery Sterile Saline Soak Directions  Perform hand soak three times daily for 10-15 minutes    Fill a container with enough warm water to cover your wound  Add one pump of antibacterial soap that you use to wash your hands  While soaking, you should exercise and move your fingers if your therapist has instructed you to do so  Throw away solution after soaking your hand, and make up fresh solution for each new soak  Pat wound/incision dry and re-dress if instructed by physician to keep the area covered           cc: I injured by fingertip    Subjective:   Arin Garcia is a right hand dominant 29 y.o. male  who presents for evaluation and treatment of a crush injury to the left small finger tip on 10/24/23 while at work. A metal plate came down on his finger. He went to the ED on 10/24/2023. X-rays were taken. He stated that he has been out of work since the injury. History reviewed. No pertinent past medical history. History reviewed. No pertinent surgical history. History reviewed. No pertinent family history.     Social History     Occupational History Not on file   Tobacco Use    Smoking status: Every Day     Packs/day: 0.25     Types: Cigars, Cigarettes    Smokeless tobacco: Not on file   Vaping Use    Vaping Use: Never used   Substance and Sexual Activity    Alcohol use: Never    Drug use: Not Currently     Types: Marijuana    Sexual activity: Not on file         Current Outpatient Medications:     cephalexin (KEFLEX) 500 mg capsule, Take 1 capsule (500 mg total) by mouth every 6 (six) hours for 7 days, Disp: 28 capsule, Rfl: 0    ibuprofen (MOTRIN) 600 mg tablet, Take 1 tablet (600 mg total) by mouth every 6 (six) hours as needed for mild pain or moderate pain, Disp: 30 tablet, Rfl: 0    oxyCODONE (Roxicodone) 5 immediate release tablet, Take 1 tablet (5 mg total) by mouth every 6 (six) hours as needed for moderate pain for up to 5 doses Max Daily Amount: 20 mg, Disp: 5 tablet, Rfl: 0    oxyCODONE (Roxicodone) 5 immediate release tablet, Take 1 tablet (5 mg total) by mouth every 4 (four) hours as needed for moderate pain Max Daily Amount: 30 mg, Disp: 6 tablet, Rfl: 0    Allergies   Allergen Reactions    Penicillins Rash       Objective:  Vitals:    10/31/23 1407   BP: 150/70       The patient was awake, alert, and oriented to person, place, and time. No acute distress. Normocephalic. EOMI. Mucous membranes moist.  Normal respiratory effort. Examination of the left small finger was performed. There is a nail plate avulsion. There is residual nail plate at the proximal nail fold. The nailbed appears clean. There is no exposed bone. Capillary refill was brisk and sensation was grossly intact. Imaging/Diagnostic Studies:    I reviewed imaging studies dated 10/24/2023 which included 3 views of the left small finger. These images studies demonstrated a distal phalanx tuft fracture. This document was created using speech voice recognition software.    Grammatical errors, random word insertions, pronoun errors, and incomplete sentences are an occasional consequence of this system due to software limitations, ambient noise, and hardware issues. Any formal questions or concerns about content, text, or information contained within the body of this dictation should be directly addressed to the provider for clarification.

## 2023-10-31 NOTE — LETTER
October 31, 2023     Patient: Darci Pham  YOB: 1990  Date of Visit: 10/31/2023      To Whom it May Concern:    Darci Pham is under my professional care. Senait Aquino was seen in my office on 10/31/2023. Senait Aquino may return to work with limitations no lifting more than 5 lbs with left hand. Keep left small finger splint on; clean and dry. .    If you have any questions or concerns, please don't hesitate to call.          Sincerely,          Damaso Duke MD        CC: No Recipients

## 2023-10-31 NOTE — PROGRESS NOTES
Orthosis    Diagnosis:   1. Avulsion of finger tip, sequela          Indication:  protection    Location: Left  small finger  Supplies: Custom Fit Orthotic and Dressing   Orthosis type: Cap splint   Wearing Schedule: Remove for hygiene only  Describe Position: DIP ext    Precautions: Fracture and Universal (skin contact/breakdown)    Patient or Caregiver expresses understanding of wearing Schedule and Precautions? Yes  Patient or Caregiver able to don/doff orthotic independently? Yes    Written orders provided to patient?  Yes  Orders Obtained: Written  Orders Obtained from: Dr Andres Kan    Return for evaluation and treatment  not at this time

## 2023-11-03 ENCOUNTER — TELEPHONE (OUTPATIENT)
Age: 33
End: 2023-11-03

## 2023-11-03 NOTE — TELEPHONE ENCOUNTER
Caller: [employer]    Doctor: Nelson Pemberton    Reason for call: Calling to get fax for [facility]. Fax provided for [place] office.     Call back#: N/A

## 2023-11-15 ENCOUNTER — OFFICE VISIT (OUTPATIENT)
Dept: OBGYN CLINIC | Facility: CLINIC | Age: 33
End: 2023-11-15
Payer: OTHER MISCELLANEOUS

## 2023-11-15 VITALS
HEIGHT: 72 IN | BODY MASS INDEX: 24.65 KG/M2 | WEIGHT: 182 LBS | SYSTOLIC BLOOD PRESSURE: 125 MMHG | DIASTOLIC BLOOD PRESSURE: 80 MMHG

## 2023-11-15 DIAGNOSIS — S61.209A AVULSION OF FINGERTIP, INITIAL ENCOUNTER: Primary | ICD-10-CM

## 2023-11-15 PROCEDURE — 99213 OFFICE O/P EST LOW 20 MIN: CPT | Performed by: ORTHOPAEDIC SURGERY

## 2023-11-15 NOTE — LETTER
November 15, 2023     Patient: Hamilton Giordano  YOB: 1990  Date of Visit: 11/15/2023      To Whom it May Concern:    Hamilton Giordano is under my professional care. America Fairchild was seen in my office on 11/15/2023. America Fairchild may return to work with limitations no lifting more than 5 lbs with left hand. Keep left small finger splint on; clean and dry. Re-evaluation in 2 weeks. If you have any questions or concerns, please don't hesitate to call.          Sincerely,          Jose M Zaman MD

## 2023-11-15 NOTE — PATIENT INSTRUCTIONS
Orthopedic Hand Surgery Sterile Saline Soak Directions  Perform hand soak three times daily for 10-15 minutes     Fill a container with enough warm water to cover your wound  Add one pump of antibacterial soap that you use to wash your hands  While soaking, you should exercise and move your fingers if your therapist has instructed you to do so  Throw away solution after soaking your hand, and make up fresh solution for each new soak  Pat wound/incision dry and re-dress if instructed by physician to keep the area covered

## 2023-11-15 NOTE — PROGRESS NOTES
Assessment/Plan:  1. Avulsion of fingertip, initial encounter            The patient's finger looks good. There is a small eschar measuring 4 mm x 6 mm. The proximal nail plate is intact. The DIP joint is stiff. The patient has a not been doing the warm soapy water soaks as instructed. he patient was given an opportunity to ask questions. Questions were answered to the patient's satisfaction. Through shared decision making, the patient decided to move forward with warm water soaks as instructed with gentle eschar debridement. Referral to hand therapy provided for wound debridement and increasing motion. Continue use of finger splint. He is scheduled to see the hand therapist on 11/20/2023. He was to see the hand therapist last week, but he missed the appointment. Work note provided. Follow up in 2 weeks    Orthopedic Hand Surgery Sterile Saline Soak Directions  Perform hand soak three times daily for 10-15 minutes     Fill a container with enough warm water to cover your wound  Add one pump of antibacterial soap that you use to wash your hands  While soaking, you should exercise and move your fingers if your therapist has instructed you to do so  Throw away solution after soaking your hand, and make up fresh solution for each new soak  Pat wound/incision dry and re-dress if instructed by physician to keep the area covered        cc: Left small finger pain    Subjective:   Fadumo Katz is a right hand dominant 29 y.o. male who presents to the office for follow up of left small finger avulsion due to a crush injury at work on 10/24/2023. Since last visit he has been doing warm water soaks every other day. He reports doing well overall. He has been wearing a finger splint. History reviewed. No pertinent past medical history. History reviewed. No pertinent surgical history. History reviewed. No pertinent family history.     Social History     Occupational History    Not on file   Tobacco Use Smoking status: Every Day     Packs/day: 0.25     Types: Cigars, Cigarettes    Smokeless tobacco: Not on file   Vaping Use    Vaping Use: Never used   Substance and Sexual Activity    Alcohol use: Never    Drug use: Not Currently     Types: Marijuana    Sexual activity: Not on file         Current Outpatient Medications:     ibuprofen (MOTRIN) 600 mg tablet, Take 1 tablet (600 mg total) by mouth every 6 (six) hours as needed for mild pain or moderate pain, Disp: 30 tablet, Rfl: 0    oxyCODONE (Roxicodone) 5 immediate release tablet, Take 1 tablet (5 mg total) by mouth every 6 (six) hours as needed for moderate pain for up to 5 doses Max Daily Amount: 20 mg (Patient not taking: Reported on 11/15/2023), Disp: 5 tablet, Rfl: 0    oxyCODONE (Roxicodone) 5 immediate release tablet, Take 1 tablet (5 mg total) by mouth every 4 (four) hours as needed for moderate pain Max Daily Amount: 30 mg (Patient not taking: Reported on 11/15/2023), Disp: 6 tablet, Rfl: 0    Allergies   Allergen Reactions    Penicillins Rash       Objective: There were no vitals filed for this visit. The patient was awake, alert, and oriented to person, place, and time. No acute distress. Normocephalic. EOMI. Mucous membranes moist.  Normal respiratory effort. Examination of the left small finger was performed. There is a distal nail plate avulsion. There is residual nail plate at the proximal nail fold. The nailbed appears clean. There is no exposed bone. Capillary refill was brisk and sensation was grossly intact. 6 mm x 4 mm eschar present at the very tip of the finger. DIP joint was stiff. Imaging/Diagnostic Studies:    No new imaging obtained today      This document was created using speech voice recognition software. Grammatical errors, random word insertions, pronoun errors, and incomplete sentences are an occasional consequence of this system due to software limitations, ambient noise, and hardware issues.    Any formal questions or concerns about content, text, or information contained within the body of this dictation should be directly addressed to the provider for clarification.      Scribe Attestation      I,:  Eben Lazaro am acting as a scribe while in the presence of the attending physician.:       I,:  Kimberlyn Bloom MD personally performed the services described in this documentation    as scribed in my presence.:

## 2023-11-17 ENCOUNTER — TELEPHONE (OUTPATIENT)
Dept: OBGYN CLINIC | Facility: CLINIC | Age: 33
End: 2023-11-17

## 2023-11-17 NOTE — TELEPHONE ENCOUNTER
Spoke with Garfield Hi just to make him aware that his personal health insurance is data mismatched for . Garfield Hi is aware and has notified the insurance company and will again after W/C appointments have all been completed.

## 2023-11-20 ENCOUNTER — TELEPHONE (OUTPATIENT)
Dept: OTHER | Facility: OTHER | Age: 33
End: 2023-11-20

## 2023-11-21 NOTE — TELEPHONE ENCOUNTER
Annita Tucker called saying that patient had his evaluation of his hand and ortho and want to know if the patient is going to still continue with hand and ortho therapy with olvin

## 2023-11-21 NOTE — TELEPHONE ENCOUNTER
[address] spoke to Dwayne Pinzon asked me to fax script for PT to [phone].  Faxed information received confirmation

## 2023-11-29 ENCOUNTER — OFFICE VISIT (OUTPATIENT)
Dept: OBGYN CLINIC | Facility: CLINIC | Age: 33
End: 2023-11-29
Payer: OTHER MISCELLANEOUS

## 2023-11-29 VITALS
SYSTOLIC BLOOD PRESSURE: 145 MMHG | BODY MASS INDEX: 24.65 KG/M2 | HEIGHT: 72 IN | DIASTOLIC BLOOD PRESSURE: 90 MMHG | WEIGHT: 182 LBS

## 2023-11-29 DIAGNOSIS — T14.8XXA CRUSH INJURY: ICD-10-CM

## 2023-11-29 DIAGNOSIS — S61.209D AVULSION OF FINGERTIP, SUBSEQUENT ENCOUNTER: Primary | ICD-10-CM

## 2023-11-29 PROCEDURE — 99213 OFFICE O/P EST LOW 20 MIN: CPT | Performed by: ORTHOPAEDIC SURGERY

## 2023-11-29 NOTE — PROGRESS NOTES
Assessment/Plan:  1. Avulsion of fingertip, subsequent encounter        2. Crush injury            The patient's finger is healing nicely; although ROM of the finger and  strength are diminished. I recommended therapy. The patient was given an opportunity to ask questions. Questions were answered to the patient's satisfaction. Through shared decision making, the patient decided to move forward with formal therapy. He was advised to be consistent with formal therapy to improve ROM and strength. Updated work note was provided today with 20 lb lifting restrictions as well as light gripping and grasping restrictions. If his job is unable to accommodate his restrictions, his employer will keep him out of work. Follow up in 4 weeks time for clinical re-evaluation. I anticipate MMI at the next office visit, provided he attends therapy consistently. cc: left small finger injury     Subjective:   Bakari Awan is a right hand dominant 29 y.o. male who presents to the office for a follow up regarding a left small finger avulsion due to a crush injury at work, DOI 10/24/23. He started formal therapy, which appears to be one session thus far. He feels his  strength is weak. Patient works as a . Review of Systems   Constitutional:  Negative for chills, fever and unexpected weight change. HENT:  Negative for hearing loss, nosebleeds and sore throat. Eyes:  Negative for pain, redness and visual disturbance. Respiratory:  Negative for cough, shortness of breath and wheezing. Cardiovascular:  Negative for chest pain, palpitations and leg swelling. Gastrointestinal:  Negative for abdominal pain, nausea and vomiting. Endocrine: Negative for polydipsia and polyuria. Genitourinary:  Negative for difficulty urinating and hematuria. Musculoskeletal:  Negative for arthralgias, joint swelling and myalgias. Skin:  Negative for rash and wound.    Neurological:  Negative for dizziness, numbness and headaches. Psychiatric/Behavioral:  Negative for decreased concentration, dysphoric mood and suicidal ideas. The patient is not nervous/anxious. History reviewed. No pertinent past medical history. History reviewed. No pertinent surgical history. History reviewed. No pertinent family history. Social History     Occupational History    Not on file   Tobacco Use    Smoking status: Every Day     Packs/day: 0.25     Types: Cigars, Cigarettes    Smokeless tobacco: Not on file   Vaping Use    Vaping Use: Never used   Substance and Sexual Activity    Alcohol use: Never    Drug use: Not Currently     Types: Marijuana    Sexual activity: Not on file         Current Outpatient Medications:     ibuprofen (MOTRIN) 600 mg tablet, Take 1 tablet (600 mg total) by mouth every 6 (six) hours as needed for mild pain or moderate pain (Patient not taking: Reported on 11/29/2023), Disp: 30 tablet, Rfl: 0    oxyCODONE (Roxicodone) 5 immediate release tablet, Take 1 tablet (5 mg total) by mouth every 6 (six) hours as needed for moderate pain for up to 5 doses Max Daily Amount: 20 mg (Patient not taking: Reported on 11/15/2023), Disp: 5 tablet, Rfl: 0    oxyCODONE (Roxicodone) 5 immediate release tablet, Take 1 tablet (5 mg total) by mouth every 4 (four) hours as needed for moderate pain Max Daily Amount: 30 mg (Patient not taking: Reported on 11/15/2023), Disp: 6 tablet, Rfl: 0    Allergies   Allergen Reactions    Penicillins Rash       Objective:  Vitals:    11/29/23 1413   BP: 145/90       The patient was awake, alert, and oriented to person, place, and time. No acute distress. Normocephalic. EOMI. Mucous membranes moist.  Normal respiratory effort. Left small finger distal nail plate avulsion, residual nail plate at the proximal fold, eschar improved, limited motion secondary to stiffness.   Patient demonstrated apprehension when instruction to perform a full composite fist.   strength was diminished. Imaging/Diagnostic Studies:    No new imaging to review       This document was created using speech voice recognition software. Grammatical errors, random word insertions, pronoun errors, and incomplete sentences are an occasional consequence of this system due to software limitations, ambient noise, and hardware issues. Any formal questions or concerns about content, text, or information contained within the body of this dictation should be directly addressed to the provider for clarification.       Scribe Attestation      I,:  Nina Sullivan am acting as a scribe while in the presence of the attending physician.:       I,:  Enrique Carlson MD personally performed the services described in this documentation    as scribed in my presence.:

## 2023-11-29 NOTE — LETTER
November 29, 2023     Patient: Rama Sauceda  YOB: 1990  Date of Visit: 11/29/2023      To Whom it May Concern:    Rama Sauceda is under my professional care. Mckenna  was seen in my office on 11/29/2023. Mckenna  may lift 20 lbs with his left upper extremity. He may lightly  and grasp with the left upper extremity. He will be re-evaluated in 4 weeks time. If you have any questions or concerns, please don't hesitate to call.          Sincerely,          Tracey Landers MD

## 2023-11-30 ENCOUNTER — TELEPHONE (OUTPATIENT)
Dept: OBGYN CLINIC | Facility: CLINIC | Age: 33
End: 2023-11-30

## 2023-11-30 NOTE — TELEPHONE ENCOUNTER
Per phone conversation yesterday with adjustor Concetta Mccarthy, I faxed ov notes to him from visit on 11/29/23. Faxed to [phone]. vd fax confirmation.

## 2023-12-01 ENCOUNTER — TELEPHONE (OUTPATIENT)
Age: 33
End: 2023-12-01

## 2023-12-01 NOTE — TELEPHONE ENCOUNTER
Caller: Freddy/Carole OT    Doctor: Bradley Copeland    Reason for call: Questioned if OT could initiate hand strengthening?  Please fax response to [facility] @[phone]    Call back#: [phone]

## 2023-12-04 NOTE — TELEPHONE ENCOUNTER
I called [facility] and left a msg with Dhaval Drew, she will make sure Martines Many gets the msg.

## 2023-12-04 NOTE — TELEPHONE ENCOUNTER
Caller: francis    Doctor: Jordy Her    Reason for call: We don't make outgoing call.  Please contact patient    Call back#: [phone]

## 2023-12-05 ENCOUNTER — HOSPITAL ENCOUNTER (EMERGENCY)
Facility: HOSPITAL | Age: 33
Discharge: HOME | End: 2023-12-05
Attending: EMERGENCY MEDICINE | Admitting: EMERGENCY MEDICINE
Payer: COMMERCIAL

## 2023-12-05 VITALS
RESPIRATION RATE: 18 BRPM | OXYGEN SATURATION: 98 % | TEMPERATURE: 97.4 F | SYSTOLIC BLOOD PRESSURE: 148 MMHG | DIASTOLIC BLOOD PRESSURE: 97 MMHG | HEART RATE: 108 BPM

## 2023-12-05 DIAGNOSIS — K04.7 DENTAL ABSCESS: ICD-10-CM

## 2023-12-05 DIAGNOSIS — K08.89 PAIN, DENTAL: Primary | ICD-10-CM

## 2023-12-05 PROCEDURE — 99284 EMERGENCY DEPT VISIT MOD MDM: CPT | Performed by: EMERGENCY MEDICINE

## 2023-12-05 PROCEDURE — 99282 EMERGENCY DEPT VISIT SF MDM: CPT

## 2023-12-05 RX ORDER — CLINDAMYCIN HYDROCHLORIDE 300 MG/1
300 CAPSULE ORAL 4 TIMES DAILY
Qty: 28 CAPSULE | Refills: 0 | Status: SHIPPED | OUTPATIENT
Start: 2023-12-05 | End: 2023-12-12

## 2023-12-05 RX ORDER — IBUPROFEN 800 MG/1
800 TABLET ORAL 3 TIMES DAILY
Qty: 21 TABLET | Refills: 0 | Status: SHIPPED | OUTPATIENT
Start: 2023-12-05

## 2023-12-05 NOTE — ED PROVIDER NOTES
History  Chief Complaint   Patient presents with    Dental Pain     Pt c/o L sided dental pain and swelling starting this morning      30 y/o M presents for one day history of left lower tooth pain. He has hx of L upper tooth pain but no complaints regarding that today. He woke up this AM and noted swelling on the left lower jaw / cheek. He had no tooth pain at rest but when he begins to chew, the pain becomes prevalent. Still has no pain at rest. No problems breathing or swallowing. He says he has not visualized any abscess in his mouth that looks like it is going to pop/drain. No fevers, chills, headache or issues with opening or closing the jaw. Prior to Admission Medications   Prescriptions Last Dose Informant Patient Reported? Taking?   ibuprofen (MOTRIN) 600 mg tablet   No No   Sig: Take 1 tablet (600 mg total) by mouth every 6 (six) hours as needed for mild pain or moderate pain   Patient not taking: Reported on 11/29/2023   oxyCODONE (Roxicodone) 5 immediate release tablet   No No   Sig: Take 1 tablet (5 mg total) by mouth every 6 (six) hours as needed for moderate pain for up to 5 doses Max Daily Amount: 20 mg   Patient not taking: Reported on 11/15/2023   oxyCODONE (Roxicodone) 5 immediate release tablet   No No   Sig: Take 1 tablet (5 mg total) by mouth every 4 (four) hours as needed for moderate pain Max Daily Amount: 30 mg   Patient not taking: Reported on 11/15/2023      Facility-Administered Medications: None       History reviewed. No pertinent past medical history. History reviewed. No pertinent surgical history. History reviewed. No pertinent family history. I have reviewed and agree with the history as documented.     E-Cigarette/Vaping    E-Cigarette Use Never User      E-Cigarette/Vaping Substances    Nicotine No     THC No     CBD No     Flavoring No     Other No     Unknown No      Social History     Tobacco Use    Smoking status: Every Day     Packs/day: 0.25     Types: Cigars, Cigarettes   Vaping Use    Vaping Use: Never used   Substance Use Topics    Alcohol use: Never    Drug use: Not Currently     Types: Marijuana        Review of Systems   Constitutional:  Negative for appetite change, chills, fatigue and fever. HENT:  Positive for dental problem and facial swelling. Negative for congestion, drooling, ear discharge, ear pain, mouth sores, postnasal drip, sinus pressure, sinus pain, sore throat, trouble swallowing and voice change. Respiratory:  Negative for cough, shortness of breath, wheezing and stridor. Cardiovascular:  Negative for palpitations. Gastrointestinal:  Negative for nausea. Musculoskeletal:  Negative for neck pain and neck stiffness. Skin:  Negative for rash and wound. Neurological:  Negative for dizziness, facial asymmetry, light-headedness, numbness and headaches. Physical Exam  ED Triage Vitals   Temperature Pulse Respirations Blood Pressure SpO2   12/05/23 1454 12/05/23 1454 12/05/23 1454 12/05/23 1456 12/05/23 1454   (!) 97.4 °F (36.3 °C) (!) 108 18 148/97 98 %      Temp Source Heart Rate Source Patient Position - Orthostatic VS BP Location FiO2 (%)   12/05/23 1454 12/05/23 1454 12/05/23 1454 12/05/23 1454 --   Tympanic Monitor Sitting Left arm       Pain Score       --                    Orthostatic Vital Signs  Vitals:    12/05/23 1454 12/05/23 1456   BP:  148/97   Pulse: (!) 108    Patient Position - Orthostatic VS: Sitting        Physical Exam  Vitals reviewed. Constitutional:       General: He is not in acute distress. Appearance: Normal appearance. He is not ill-appearing. HENT:      Head: Normocephalic and atraumatic. Nose: Nose normal. No congestion or rhinorrhea. Mouth/Throat:      Mouth: Mucous membranes are dry. Pharynx: Oropharynx is clear. No oropharyngeal exudate or posterior oropharyngeal erythema. Comments: No visible abscess in mouth. Floor of mouth is soft and non-tender to palpation.  OP clear and patent with no abscess visualized. Tooth #30, L lower molar, is cracked posteriorly with grey tissue exposed. Tooth is tender to palpation with tongue depressor; no pus or drainage expressed. Appears abscess is extending outward toward cheek, as there is edema visible upon inspection. Pulmonary:      Effort: Pulmonary effort is normal. No respiratory distress. Musculoskeletal:      Cervical back: Normal range of motion. No rigidity. Lymphadenopathy:      Cervical: No cervical adenopathy. Skin:     General: Skin is warm and dry. Findings: No erythema or rash. Neurological:      Mental Status: He is alert and oriented to person, place, and time. Cranial Nerves: No cranial nerve deficit. ED Medications  Medications - No data to display    Diagnostic Studies  Results Reviewed       None                   No orders to display         Procedures  Procedures      ED Course                                       Medical Decision Making  30 y/o M presents to the ED for evaluation of acute onset L lower dental pain. No abscess visualized on exam. Floor of mouth non-tender and soft. No concern for Carlos's based on assessment / exam. Appears abscess if forming and extending laterally to left cheek, as there eis some associated swelling noted. Offered alveolar block , which patient declined. We will prescribe clindamycin, due to penicillin allergy. Referral to [address] [place] [address] dental clinic also given with instructions to call and let them know he was in the ED this afternoon. Patient verbalized understanding. Rx for clindamycin and ibuprofen called into pharmacy of choice. Patient stable in the ED and discharged home in stable condition with plan to f/u with dental clinic ASAP. Risk  Prescription drug management.           Disposition  Final diagnoses:   Pain, dental   Dental abscess     Time reflects when diagnosis was documented in both MDM as applicable and the Disposition within this note Time User Action Codes Description Comment    12/5/2023  3:48 PM Elicia Esparza Add [K08.89] Pain, dental     12/5/2023  3:48 PM Elicia Epsarza Add [K04.7] Dental abscess           ED Disposition       ED Disposition   Discharge    Condition   Stable    Date/Time   Tue Dec 5, 2023  3:48 PM    Comment   Nemesio De Leon discharge to home/self care. Follow-up Information       Follow up With Specialties Details Why [address]  Call in 1 day  [address] [place] [address]  [phone]            Patient's Medications   Discharge Prescriptions    CLINDAMYCIN (CLEOCIN) 300 MG CAPSULE    Take 1 capsule (300 mg total) by mouth 4 (four) times a day for 7 days       Start Date: 12/5/2023 End Date: 12/12/2023       Order Dose: 300 mg       Quantity: 28 capsule    Refills: 0    IBUPROFEN (MOTRIN) 800 MG TABLET    Take 1 tablet (800 mg total) by mouth 3 (three) times a day       Start Date: 12/5/2023 End Date: --       Order Dose: 800 mg       Quantity: 21 tablet    Refills: 0     No discharge procedures on file. PDMP Review       None             ED Provider  Attending physically available and evaluated Nemesio De Leon. I managed the patient along with the ED Attending.     Electronically Signed by           Elicia Esparza MD  12/05/23 1600

## 2023-12-07 ENCOUNTER — OFFICE VISIT (OUTPATIENT)
Dept: DENTISTRY | Facility: CLINIC | Age: 33
End: 2023-12-07

## 2023-12-07 VITALS — HEART RATE: 92 BPM | DIASTOLIC BLOOD PRESSURE: 82 MMHG | SYSTOLIC BLOOD PRESSURE: 134 MMHG

## 2023-12-07 DIAGNOSIS — K04.7 CHRONIC APICAL ABSCESS: Primary | ICD-10-CM

## 2023-12-07 PROCEDURE — D0220 INTRAORAL - PERIAPICAL FIRST RADIOGRAPHIC IMAGE: HCPCS

## 2023-12-07 PROCEDURE — D0140 LIMITED ORAL EVALUATION - PROBLEM FOCUSED: HCPCS

## 2023-12-07 NOTE — DENTAL PROCEDURE DETAILS
Emergency LL    S: CC: "I have pain in the lower left. I went to the ER a few days ago and they directed me here."    Patient reports no medical conditions. Allergy to penicillin. Currently taking clindamycin and ibuprofen. Pt seen at ER 2 days ago for dental pain in LL. Facial swelling and dental pain in LL recorded. Prescribed 300 mg clindamycin for 7 days, 800 mg ibuprofen as needed for pain. Onset: end of October  Provocation: air, cold, water, chewing  Palliation: clindamycin, ibuprofen   Quality: throbbing  Region/Radiation: left side of face up to ear  Severity: 2/10  Timing: hx of keeping pt up at night    Patient reported that swelling inside the mouth went down after pus drained from gums yesterday. Consistent with abscess. O: PA taken of #19. PAP identified on mesial and distal roots. Radiolucency subg on distal  #19: (abnormal) Percussion, (NR) Cold, (abnormal) Palpation    EO : Mild extraoral swelling, TMJ and mandibular range of motion WNL,   IO:  oral cancer screening completed, no significant findings or soft tissue abnormalities. No intraoral swelling identified. #19 distal cavitation extending subgingival, deemed non-restorable due to extent of cavitation and caries. 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 A: #19: necrotic pulp with chronic apical abscess. Requires ext due to nonrestorability. P: Informed Pt that #19 is not savable, should be extracted asap to prevent flare up of infection during or after antibiotic. Discussed pros, cons, and timelines related to extraction. Mentioned that #19 may require sectioning with drill in order to remove efficiently. Patient understands. All questions and concerns addressed. Recommended pt schedule appointment to ext #19 asap. Patient also informed us that he will try to see if we accept wife's insurance, as patient is dnbi. Expressed interest in becoming patient for comprehensive care.  Patient left comfortably and ambulatory.      Complications: none    Attending: Dr. Deni Horta     NV: ext #19  NVV: comprehensive exam

## 2023-12-09 NOTE — ED ATTENDING ATTESTATION
Final Diagnoses:     1. Pain, dental    2. Dental abscess           Shahid TOBIN DO, saw and evaluated the patient. All available labs and X-rays were ordered by me or the resident / non-physician and have been reviewed by myself. I discussed the patient with the resident / non-physician and agree with the resident's / non-physician practitioner's findings and plan as documented in the resident's / non-physician practicitioner's note, except where noted. At this point, I agree with the current assessment done in the ED. I was present during key portions of all procedures performed unless otherwise stated. Nursing Triage:     Chief Complaint   Patient presents with    Dental Pain     Pt c/o L sided dental pain and swelling starting this morning        HPI:   This is a 29 y.o. male presenting for evaluation of left dental pain. Abscess likely. No trismus. Unable to be drained. here    ASSESSMENT + PLAN:   Dental infection  Doubt ludwigs, floor of mouth soft  Plan pain control, abx    Physical:     Vital signs reviewed. Gen. appearance: No acute distress. Alert and oriented x3. Head: Atraumatic, normocephalic. Eyes: EOMI, PERRLA. No icterus. Neck: Supple, no lymphadenopathy, full range of motion. Chest: Regular rate and rhythm. Lungs are clear to auscultation bilaterally. Nontender. Abdomen: Soft nontender nondistended. No signs of ecchymosis. Positive bowel sounds. Extremities: Full range of motion in all extremities. DTRs intact. Neuro: Cranial nerves II through XII intact. Nonfocal exam. GCS 15  Skin: Warm, dry. Vital signs reviewed. Past Medical:    has no past medical history on file. Past Surgical:    has no past surgical history on file. No orders to display       I reviewed patient's most recent discharge summary and/or outpatient office notes. Portions of the record may have been created with voice recognition software.  Occasional wrong word or "sound a like" substitutions may have occurred due to the inherent limitations of voice recognition software. Read the chart carefully and recognize, using context, where substitutions have occurred.     Electronically signed:  Rochelle Vasquez DO

## 2023-12-26 ENCOUNTER — OFFICE VISIT (OUTPATIENT)
Dept: OBGYN CLINIC | Facility: CLINIC | Age: 33
End: 2023-12-26
Payer: OTHER MISCELLANEOUS

## 2023-12-26 VITALS
WEIGHT: 182 LBS | SYSTOLIC BLOOD PRESSURE: 146 MMHG | HEART RATE: 87 BPM | HEIGHT: 72 IN | BODY MASS INDEX: 24.65 KG/M2 | DIASTOLIC BLOOD PRESSURE: 92 MMHG

## 2023-12-26 DIAGNOSIS — T14.8XXA CRUSH INJURY: ICD-10-CM

## 2023-12-26 DIAGNOSIS — S61.209D AVULSION OF FINGERTIP, SUBSEQUENT ENCOUNTER: Primary | ICD-10-CM

## 2023-12-26 PROCEDURE — 99213 OFFICE O/P EST LOW 20 MIN: CPT | Performed by: ORTHOPAEDIC SURGERY

## 2023-12-26 NOTE — LETTER
December 26, 2023     Patient: Trevon Delong  YOB: 1990  Date of Visit: 12/26/2023      To Whom it May Concern:    Trevon Delong is under my professional care. Trevon was seen in my office on 12/26/2023. Trevon may return to work without restrictions in regards to his left small finger.    If you have any questions or concerns, please don't hesitate to call.         Sincerely,          Olivia Alaniz MD

## 2023-12-26 NOTE — PROGRESS NOTES
Assessment/Plan:  1. Avulsion of fingertip, subsequent encounter        2. Crush injury            The patient's finger has healed.   Through shared decision making, the patient decided to move forward with transitioning to activities as tolerated.  Work note provided stating he may return to work without restrictions in regards to his left small finger.  He may continue hand therapy exercises on his own.  The patient's finger is at [facility]MI            cc: Left small finger injury    Subjective:   Trevon Delong is a right hand dominant 33 y.o. male who presents to the office for follow up of left small finger avulsion due to a crush injury at work on 10/24/2023. He has attended hand therapy and reports doing well overall. He also reports knee and low back pain.     Review of Systems   Constitutional:  Negative for chills and fever.   HENT:  Negative for drooling, sneezing and voice change.    Eyes:  Negative for discharge and redness.   Respiratory:  Negative for cough and wheezing.    Gastrointestinal:  Negative for vomiting.   Skin:  Negative for color change.   Psychiatric/Behavioral:  Negative for behavioral problems. The patient is not nervous/anxious.          History reviewed. No pertinent past medical history.    History reviewed. No pertinent surgical history.    History reviewed. No pertinent family history.    Social History     Occupational History    Not on file   Tobacco Use    Smoking status: Every Day     Current packs/day: 0.25     Types: Cigars, Cigarettes    Smokeless tobacco: Not on file   Vaping Use    Vaping status: Never Used   Substance and Sexual Activity    Alcohol use: Never    Drug use: Not Currently     Types: Marijuana    Sexual activity: Not on file         Current Outpatient Medications:     ibuprofen (MOTRIN) 800 mg tablet, Take 1 tablet (800 mg total) by mouth 3 (three) times a day, Disp: 21 tablet, Rfl: 0    ibuprofen (MOTRIN) 600 mg tablet, Take 1 tablet (600 mg total) by mouth every 6  (six) hours as needed for mild pain or moderate pain (Patient not taking: Reported on 11/29/2023), Disp: 30 tablet, Rfl: 0    oxyCODONE (Roxicodone) 5 immediate release tablet, Take 1 tablet (5 mg total) by mouth every 6 (six) hours as needed for moderate pain for up to 5 doses Max Daily Amount: 20 mg (Patient not taking: Reported on 11/15/2023), Disp: 5 tablet, Rfl: 0    oxyCODONE (Roxicodone) 5 immediate release tablet, Take 1 tablet (5 mg total) by mouth every 4 (four) hours as needed for moderate pain Max Daily Amount: 30 mg (Patient not taking: Reported on 11/15/2023), Disp: 6 tablet, Rfl: 0    Allergies   Allergen Reactions    Penicillins Rash       Objective:  Vitals:    12/26/23 1531   BP: 146/92   Pulse: 87       The patient was awake, alert, and oriented to person, place, and time.  No acute distress.  Normocephalic.  EOMI.  Mucous membranes moist.  Normal respiratory effort.    Examination of the affected extremity was compared to the unaffected extremity.  Skin was intact.  No swelling or ecchymosis.  No deformity.  Hand and fingers were warm and well-perfused.  Capillary refill was brisk.  Full active range of motion of the elbows, forearms, wrists, and fingers.  The small finger has completely healed and remodeled.  5/5 .       Imaging/Diagnostic Studies:    No new imaging obtained today        This document was created using speech voice recognition software.   Grammatical errors, random word insertions, pronoun errors, and incomplete sentences are an occasional consequence of this system due to software limitations, ambient noise, and hardware issues.   Any formal questions or concerns about content, text, or information contained within the body of this dictation should be directly addressed to the provider for clarification.     Scribe Attestation      I,:  Angie Diaz am acting as a scribe while in the presence of the attending physician.:       I,:  Olivia Alaniz MD personally  performed the services described in this documentation    as scribed in my presence.:

## 2024-01-10 ENCOUNTER — TELEPHONE (OUTPATIENT)
Age: 34
End: 2024-01-10

## 2024-01-10 NOTE — TELEPHONE ENCOUNTER
Caller: Gagan price    Doctor/Office: Corbin SPEAR#: n/a      What needs to be faxed: 12/26 OVN and work status    ATTN to: Romario     Fax#: [phone]       Documents were successfully e-faxed

## 2024-01-31 ENCOUNTER — TELEPHONE (OUTPATIENT)
Age: 34
End: 2024-01-31

## 2024-01-31 NOTE — TELEPHONE ENCOUNTER
Caller: Carolyn Lam in [place]    Doctor: Corbin    Reason for call: Carolyn is requesting the plan of care that was faxed on 12/18/23, 12/26/2023 & 1/8/2024 be signed and returned. I asked her to fax it again and confirmed the fax.    Call back#: [phone]  Fax#: [phone]

## 2024-02-01 NOTE — TELEPHONE ENCOUNTER
Called [facility] and spoke to Lindy gave her fax updated fax number for Dr. Alaniz [phone].  [facility] will refax information.

## 2024-04-30 ENCOUNTER — HOSPITAL ENCOUNTER (EMERGENCY)
Facility: HOSPITAL | Age: 34
Discharge: HOME | End: 2024-04-30
Attending: EMERGENCY MEDICINE | Admitting: EMERGENCY MEDICINE
Payer: COMMERCIAL

## 2024-04-30 VITALS
HEART RATE: 82 BPM | DIASTOLIC BLOOD PRESSURE: 74 MMHG | RESPIRATION RATE: 15 BRPM | TEMPERATURE: 98.1 F | SYSTOLIC BLOOD PRESSURE: 129 MMHG | OXYGEN SATURATION: 98 %

## 2024-04-30 DIAGNOSIS — K04.7 DENTAL ABSCESS: Primary | ICD-10-CM

## 2024-04-30 PROCEDURE — 99284 EMERGENCY DEPT VISIT MOD MDM: CPT | Performed by: EMERGENCY MEDICINE

## 2024-04-30 PROCEDURE — 99283 EMERGENCY DEPT VISIT LOW MDM: CPT

## 2024-04-30 RX ORDER — IBUPROFEN 600 MG/1
600 TABLET ORAL ONCE
Status: COMPLETED | OUTPATIENT
Start: 2024-04-30 | End: 2024-04-30

## 2024-04-30 RX ORDER — AMOXICILLIN AND CLAVULANATE POTASSIUM 875; 125 MG/1; MG/1
1 TABLET, FILM COATED ORAL ONCE
Status: COMPLETED | OUTPATIENT
Start: 2024-04-30 | End: 2024-04-30

## 2024-04-30 RX ORDER — CHLORHEXIDINE GLUCONATE ORAL RINSE 1.2 MG/ML
15 SOLUTION DENTAL 2 TIMES DAILY
Qty: 120 ML | Refills: 0 | Status: SHIPPED | OUTPATIENT
Start: 2024-04-30

## 2024-04-30 RX ORDER — AMOXICILLIN AND CLAVULANATE POTASSIUM 875; 125 MG/1; MG/1
1 TABLET, FILM COATED ORAL EVERY 12 HOURS
Qty: 14 TABLET | Refills: 0 | Status: SHIPPED | OUTPATIENT
Start: 2024-04-30 | End: 2024-05-07

## 2024-04-30 RX ADMIN — IBUPROFEN 600 MG: 600 TABLET, FILM COATED ORAL at 13:30

## 2024-04-30 RX ADMIN — AMOXICILLIN AND CLAVULANATE POTASSIUM 1 TABLET: 875; 125 TABLET, FILM COATED ORAL at 13:30

## 2024-04-30 NOTE — ED ATTENDING ATTESTATION
"4/30/2024  I, Karli Duncan DO, saw and evaluated the patient. I have discussed the patient with the resident/non-physician practitioner and agree with the resident's/non-physician practitioner's findings, Plan of Care, and MDM as documented in the resident's/non-physician practitioner's note, except where noted. All available labs and Radiology studies were reviewed.  I was present for key portions of any procedure(s) performed by the resident/non-physician practitioner and I was immediately available to provide assistance.       At this point I agree with the current assessment done in the Emergency Department.  I have conducted an independent evaluation of this patient a history and physical is as follows:    Chief Complaint   Patient presents with    Abscess     Pt states he has abscess on L lower gum line. States the abscess \"popped\" yesterday and was draining blood and pus. Is c/o pain and L cheek swelling.        33-year-old male presents with left lower molar pain.  Patient had an abscess there that \"popped\" yesterday patient presents complaining of discomfort.  No fevers.  No other complaints.  On exam-no acute distress, heart regular, no respiratory distress, no trismus, noted to have poor dentition generally.  Plan-will start antibiotics and chlorhexidine, follow-up dentist    ED Course         Critical Care Time  Procedures      "

## 2024-04-30 NOTE — DISCHARGE INSTRUCTIONS
It is very important that you see your dentist.  You will need to be on antibiotics and mouthwash for the next few days.  Dentist will likely extract your tooth.  If you do not get this tooth extracted you are at very high risk for having severe complications.    If you have significant worsening of your facial swelling, if you are having difficulty swallowing, or if you cannot open your mouth come back to the emergency department for reevaluation.

## 2024-04-30 NOTE — Clinical Note
Trevon Delong was seen and treated in our emergency department on 4/30/2024.                Diagnosis:     Trevon  is off the rest of the shift today.    He may return on this date:          If you have any questions or concerns, please don't hesitate to call.      Alejandro Schulz MD    ______________________________           _______________          _______________  Hospital Representative                              Date                                Time

## 2024-05-02 NOTE — ED PROVIDER NOTES
"History  Chief Complaint   Patient presents with    Abscess     Pt states he has abscess on L lower gum line. States the abscess \"popped\" yesterday and was draining blood and pus. Is c/o pain and L cheek swelling.      This is a 33-year-old male who is presenting with pain and swelling to his left lower molar (tooth #19).  Patient states that this tooth has been causing him issues for the past year.  He has had recurrent abscesses at the gumline over the past year and has been on antibiotics and been evaluated by dentistry per chart review.  Patient has been told that this tooth is not salvageable and he needs it extracted however has not gone through with extraction at this time.  Patient reports that a couple days ago he began experiencing swelling at the gumline.  Last night he began to feel like there was fluid dripping down the back of his throat.  He states when he looked this morning there was scant pus from the bottom of the molar in question.  He is experiencing significant pain at the tooth at different times however he has been using topical anesthetic at home for relief of his pain and states that currently his pain is fairly well-controlled.  He has not been on any recent antibiotics.  He is denying any fevers, chills, dysphonia, trismus, or chest pain/throat pain.        Prior to Admission Medications   Prescriptions Last Dose Informant Patient Reported? Taking?   ibuprofen (MOTRIN) 600 mg tablet   No No   Sig: Take 1 tablet (600 mg total) by mouth every 6 (six) hours as needed for mild pain or moderate pain   Patient not taking: Reported on 11/29/2023   ibuprofen (MOTRIN) 800 mg tablet   No No   Sig: Take 1 tablet (800 mg total) by mouth 3 (three) times a day   oxyCODONE (Roxicodone) 5 immediate release tablet   No No   Sig: Take 1 tablet (5 mg total) by mouth every 6 (six) hours as needed for moderate pain for up to 5 doses Max Daily Amount: 20 mg   Patient not taking: Reported on 11/15/2023 "   oxyCODONE (Roxicodone) 5 immediate release tablet   No No   Sig: Take 1 tablet (5 mg total) by mouth every 4 (four) hours as needed for moderate pain Max Daily Amount: 30 mg   Patient not taking: Reported on 11/15/2023      Facility-Administered Medications: None       No past medical history on file.    No past surgical history on file.    No family history on file.  I have reviewed and agree with the history as documented.    E-Cigarette/Vaping    E-Cigarette Use Never User      E-Cigarette/Vaping Substances    Nicotine No     THC No     CBD No     Flavoring No     Other No     Unknown No      Social History     Tobacco Use    Smoking status: Every Day     Current packs/day: 0.25     Types: Cigars, Cigarettes   Vaping Use    Vaping status: Never Used   Substance Use Topics    Alcohol use: Never    Drug use: Not Currently     Types: Marijuana        Review of Systems   Constitutional:  Negative for chills, fatigue and fever.   HENT:  Positive for dental problem. Negative for congestion and sore throat.    Eyes:  Negative for pain and visual disturbance.   Respiratory:  Negative for cough, chest tightness and shortness of breath.    Cardiovascular:  Negative for chest pain and palpitations.   Gastrointestinal:  Negative for abdominal pain, blood in stool, constipation, diarrhea, nausea and vomiting.   Genitourinary:  Negative for dysuria, flank pain and hematuria.   Musculoskeletal:  Negative for arthralgias, back pain and neck pain.   Skin:  Negative for color change and rash.   Neurological:  Negative for dizziness, syncope and light-headedness.   Hematological:  Negative for adenopathy. Does not bruise/bleed easily.   All other systems reviewed and are negative.      Physical Exam  ED Triage Vitals   Temperature Pulse Respirations Blood Pressure SpO2   04/30/24 1135 04/30/24 1135 04/30/24 1135 04/30/24 1135 04/30/24 1135   98.1 °F (36.7 °C) 82 15 129/74 98 %      Temp src Heart Rate Source Patient Position -  Orthostatic VS BP Location FiO2 (%)   -- -- 04/30/24 1135 04/30/24 1135 --     Lying Right arm       Pain Score       04/30/24 1136       4             Orthostatic Vital Signs  Vitals:    04/30/24 1135   BP: 129/74   Pulse: 82   Patient Position - Orthostatic VS: Lying       Physical Exam  Vitals and nursing note reviewed.   Constitutional:       General: He is not in acute distress.     Appearance: He is well-developed and normal weight. He is not toxic-appearing or diaphoretic.   HENT:      Head: Normocephalic and atraumatic.      Right Ear: External ear normal.      Left Ear: External ear normal.      Nose: Nose normal. No congestion or rhinorrhea.      Mouth/Throat:      Mouth: Mucous membranes are moist.      Dentition: Gingival swelling, dental caries, dental abscesses and gum lesions present.      Pharynx: No oropharyngeal exudate or posterior oropharyngeal erythema.        Comments: Tooth #19 is fractured on posterior edge, has some swelling in the gingival edge, especially on buccal side of gumline.  No appreciable pus at this time.  No underlying fluctuance, mild tenderness with palpation.  Significant tenderness with percussion of this tooth.  There is significant other dental caries throughout multiple teeth.  A few of the teeth are malaligned and possibly fractured as well.  Mild gingival disease throughout.  Although patient had some complaints of facial swelling, I do not appreciate significant facial swelling on my examination, no fluctuance in the jawline or the cheek.  Eyes:      General: No scleral icterus.     Extraocular Movements: Extraocular movements intact.      Conjunctiva/sclera: Conjunctivae normal.      Pupils: Pupils are equal, round, and reactive to light.   Cardiovascular:      Rate and Rhythm: Normal rate and regular rhythm.      Pulses: Normal pulses.      Heart sounds: No murmur heard.  Pulmonary:      Effort: Pulmonary effort is normal. No respiratory distress.      Breath sounds:  Normal breath sounds. No wheezing or rales.   Abdominal:      Palpations: Abdomen is soft. There is no mass.      Tenderness: There is no abdominal tenderness. There is no right CVA tenderness, left CVA tenderness or guarding.      Hernia: No hernia is present.   Musculoskeletal:         General: No swelling. Normal range of motion.      Cervical back: Normal range of motion and neck supple.      Right lower leg: No edema.      Left lower leg: No edema.   Skin:     General: Skin is warm and dry.      Capillary Refill: Capillary refill takes less than 2 seconds.   Neurological:      General: No focal deficit present.      Mental Status: He is alert and oriented to person, place, and time.   Psychiatric:         Mood and Affect: Mood normal.         Behavior: Behavior normal.         ED Medications  Medications   amoxicillin-clavulanate (AUGMENTIN) 875-125 mg per tablet 1 tablet (1 tablet Oral Given 4/30/24 1330)   ibuprofen (MOTRIN) tablet 600 mg (600 mg Oral Given 4/30/24 1330)       Diagnostic Studies  Results Reviewed       None                   No orders to display         Procedures  Procedures      ED Course                             SBIRT 22yo+      Flowsheet Row Most Recent Value   Initial Alcohol Screen: US AUDIT-C     1. How often do you have a drink containing alcohol? 0 Filed at: 04/30/2024 1136   2. How many drinks containing alcohol do you have on a typical day you are drinking?  0 Filed at: 04/30/2024 1136   3a. Male UNDER 65: How often do you have five or more drinks on one occasion? 0 Filed at: 04/30/2024 1136   3b. FEMALE Any Age, or MALE 65+: How often do you have 4 or more drinks on one occassion? 0 Filed at: 04/30/2024 1136   Audit-C Score 0 Filed at: 04/30/2024 1136   JUNO: How many times in the past year have you...    Used an illegal drug or used a prescription medication for non-medical reasons? Never Filed at: 04/30/2024 1136                  Medical Decision Making    - We will write for  chlorhexidine to sterilize the area and prevent further worsening of infection or repeat infection.  - Given the appearance, we will also do antibiotics:       - Augmentin 20mg/kg BID  - The patient will follow-up with his primary care or dentist for re-evaluation of her symptoms referral provided  - The patient has no red flags for Carlos's or serious dental infection at this juncture. The patient doesn't appear toxic, nor has rapid progression of symptoms. Patient isn't immunocompromised. Patient has no SOB nor trouble swallowing. Patient doesn't appear dehydrated nor demonstrates trismus on exam.      Risk  Prescription drug management.          Disposition  Final diagnoses:   Dental abscess     Time reflects when diagnosis was documented in both MDM as applicable and the Disposition within this note       Time User Action Codes Description Comment    4/30/2024  1:21 PM Alejandro Schulz Add [K04.7] Dental abscess           ED Disposition       ED Disposition   Discharge    Condition   Stable    Date/Time   Tue Apr 30, 2024 1321    Comment   Trevon Delong discharge to home/self care.                   Follow-up Information       Follow up With Specialties Details Why Contact Info Additional Information    [facility] Emergency Department Emergency Medicine Go to  If symptoms worsen, As needed [address] [facility] [address]  [phone] [facility] Emergency Department, [address] [place] [address], [id number]   [phone]    Davide Rivas DDS [facility]   [address] [place] [address] - [address]  [facility] [address]  [phone]               Discharge Medication List as of 4/30/2024  1:23 PM        START taking these medications    Details   amoxicillin-clavulanate (AUGMENTIN) 875-125 mg per tablet Take 1 tablet by mouth every 12 (twelve) hours for 7 days, Starting Tue 4/30/2024, Until Tue 5/7/2024, Normal      chlorhexidine (PERIDEX) 0.12 %  solution Apply 15 mL to the mouth or throat 2 (two) times a day, Starting Tue 4/30/2024, Normal           CONTINUE these medications which have NOT CHANGED    Details   !! ibuprofen (MOTRIN) 600 mg tablet Take 1 tablet (600 mg total) by mouth every 6 (six) hours as needed for mild pain or moderate pain, Starting Tue 10/24/2023, Normal      !! ibuprofen (MOTRIN) 800 mg tablet Take 1 tablet (800 mg total) by mouth 3 (three) times a day, Starting Tue 12/5/2023, Normal      !! oxyCODONE (Roxicodone) 5 immediate release tablet Take 1 tablet (5 mg total) by mouth every 6 (six) hours as needed for moderate pain for up to 5 doses Max Daily Amount: 20 mg, Starting Tue 10/24/2023, Normal      !! oxyCODONE (Roxicodone) 5 immediate release tablet Take 1 tablet (5 mg total) by mouth every 4 (four) hours as needed for moderate pain Max Daily Amount: 30 mg, Starting Fri 10/27/2023, Normal       !! - Potential duplicate medications found. Please discuss with provider.            PDMP Review       None             ED Provider  Attending physically available and evaluated Trevon Delong. I managed the patient along with the ED Attending.    Electronically Signed by           Alejandro Schulz MD  05/01/24 2017

## 2024-05-13 ENCOUNTER — OFFICE VISIT (OUTPATIENT)
Dept: DENTISTRY | Facility: CLINIC | Age: 34
End: 2024-05-13

## 2024-05-13 ENCOUNTER — TELEPHONE (OUTPATIENT)
Dept: INTERNAL MEDICINE CLINIC | Facility: CLINIC | Age: 34
End: 2024-05-13

## 2024-05-13 VITALS — SYSTOLIC BLOOD PRESSURE: 115 MMHG | DIASTOLIC BLOOD PRESSURE: 80 MMHG | HEART RATE: 77 BPM

## 2024-05-13 DIAGNOSIS — K04.7 DENTAL INFECTION: Primary | ICD-10-CM

## 2024-05-13 DIAGNOSIS — K04.7 CHRONIC APICAL ABSCESS: ICD-10-CM

## 2024-05-13 PROCEDURE — D0140 LIMITED ORAL EVALUATION - PROBLEM FOCUSED: HCPCS

## 2024-05-13 RX ORDER — CLINDAMYCIN HYDROCHLORIDE 300 MG/1
300 CAPSULE ORAL 3 TIMES DAILY
Qty: 15 CAPSULE | Refills: 0 | Status: SHIPPED | OUTPATIENT
Start: 2024-05-13 | End: 2024-05-18

## 2024-05-13 NOTE — DENTAL PROCEDURE DETAILS
"Limited Exam    Trevon Delong 33 y.o. male presents to [facility] for Limited exam  PMH reviewed, no changes, ASA III. Patient mentioned that he was diagnosed with thrombocytopenia at one time, but has not been to a doctor in a few years.   Treatment consents signed: Yes  Pain scale: 5  Perio: NA   Caries risk assessment: NA   Radiographs: PA of #19- 12/2023  Oral hygiene instructions reviewed and given.   Hygiene recall visits recommended to patient.     Chief complaint: \"The bottom left tooth is still bothering me.\"     Consent:  Discussed that limited exam focuses on problem area, and same day tx is not guaranteed.  Patient explained to if they wish to have anything else evaluated, they need to return to the practice at which they are a patient of record or receive a comprehensive exam.  Patient understands and consents.    Subjective history:    Onset: 2-3 days ago   Provocation: Biting   Quality: Dull   Region: Patient points to tooth #19   Severity: 5/10   Timing: comes and goes    Objective clinical findings:   Oral cancer screening: No abnormalities detected   Extraoral exam: WNL   Intraoral exam: #19-large DO caries with PAP with questionable crown to root ratio for restoration. #19 was deemed non-restorable at last encounter. 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 Patient said he noticed a \"bubble\" a few days ago.    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 If not, referral to OS should be written.     Referrals: Emphasized importance of following up with PCP for blood work and to check platelet count.     Comprehensive care disposition:  Patient expressed interest in becoming a patient of record with one of the  dental clinics.    Rx: Clindamycin 300mg 15 caps, TID for 5 days. Prescription signed by Dr. Rivas.     Patient dismissed ambulatory and alert.  NV: #19 extraction " (after pt goes to PCP)    Attending: Dr. Rob Plascencia
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
## 2024-05-14 ENCOUNTER — TELEPHONE (OUTPATIENT)
Dept: INTERNAL MEDICINE CLINIC | Facility: CLINIC | Age: 34
End: 2024-05-14

## 2024-09-17 ENCOUNTER — HOSPITAL ENCOUNTER (EMERGENCY)
Facility: HOSPITAL | Age: 34
Discharge: HOME | End: 2024-09-17
Attending: EMERGENCY MEDICINE
Payer: COMMERCIAL

## 2024-09-17 VITALS
HEIGHT: 72 IN | RESPIRATION RATE: 18 BRPM | HEART RATE: 79 BPM | DIASTOLIC BLOOD PRESSURE: 80 MMHG | OXYGEN SATURATION: 99 % | WEIGHT: 185 LBS | BODY MASS INDEX: 25.06 KG/M2 | SYSTOLIC BLOOD PRESSURE: 140 MMHG | TEMPERATURE: 98.7 F

## 2024-09-17 DIAGNOSIS — R21 RASH: Primary | ICD-10-CM

## 2024-09-17 PROCEDURE — 99282 EMERGENCY DEPT VISIT SF MDM: CPT

## 2024-09-17 PROCEDURE — 99284 EMERGENCY DEPT VISIT MOD MDM: CPT | Performed by: EMERGENCY MEDICINE

## 2024-09-17 RX ORDER — PREDNISONE 20 MG/1
40 TABLET ORAL DAILY
Qty: 14 TABLET | Refills: 0 | Status: SHIPPED | OUTPATIENT
Start: 2024-09-17 | End: 2024-09-24

## 2024-09-17 NOTE — DISCHARGE INSTRUCTIONS
Please follow-up with primary care provider and dermatology-a referral was made for you.  Please take medications as prescribed.  You can use topical lotions for symptom control as well.

## 2024-09-17 NOTE — ED ATTENDING ATTESTATION
"I, Mickey Betancourt MD, saw and evaluated the patient. I have discussed the patient with the resident and agree with the resident's findings, Plan of Care, and MDM as documented in the resident's note, except where noted. All available labs and Radiology studies were reviewed.  I was present for key portions of any procedure(s) performed by the resident and I was immediately available to provide assistance.    At this point I agree with the current assessment done in the Emergency Department.  I have conducted an independent evaluation of this patient a history and physical is as follows:    35 yo male with no significant past medical history presents to the ED complaining of a diffuse rash x several weeks. The patient says he has had this rash intermittently for the past few years. He was seen in the ED last September for this issue and was prescribed an oral steroid that \"worked great\". He was also referred to Dermatology at that time but he did not follow up due to a variety of issues.(+) Scattered circular pruritic erythematous/scaling lesions to all 4 extremities, neck, and scalp. No fevers or chills. He has never seen a dermatologist and does not have a PCP. No chest pain, shortness of breath, cough, nausea, or vomiting. No other specific complaints.     ROS: per resident physician note     Gen: NAD, AA&Ox3  HEENT: PERRL, EOMI  Neck: supple  CV: RRR  Lungs: CTA B/L  Abdomen: soft, NT/ND  Ext: no swelling or deformity  Neuro: 5/5 strength all extremities, sensation grossly intact  Skin: (+) small erythematous circular plaques with white scaling to extremities, neck, and scalp, no palm/sole involvement, no mucous membrane lesions     ED Course  The patient is comfortable appearing with stable vital signs. Rash has been intermittent x \"years\" but he has not yet followed up with Dermatology as instructed. Psoriasis vs eczema vs contact dermatitis? Plan for a course of oral steroids and close follow up with " Dermatology for further workup/management. Ambulatory referral to Dermatology entered. The patient is agreeable to this plan. Strict return precautions and work note provided.    Critical Care Time  Procedures

## 2024-09-17 NOTE — ED PROVIDER NOTES
1. Rash      ED Disposition       ED Disposition   Discharge    Condition   Stable    Date/Time   Tue Sep 17, 2024  5:11 PM    Comment   Trevon Delong discharge to home/self care.                   Assessment & Plan       Medical Decision Making  Patient is a 34-year-old male presenting for rash.    Differential includes but not limited to eczema, psoriasis, doubt contact dermatitis, doubt psoriatic arthritis.    Patient cleared for discharge with PCP follow-up, dermatology referral, systemic steroids, and return precautions.    Risk  Prescription drug management.                     Medications - No data to display    History of Present Illness       Patient is a 34-year-old male without significant past medical history presenting for rash.  Patient states he was seen in the ED in the past and told that he might have psoriasis.  He was given dermatology follow-up but never went.  Patient states that this has been progressively getting worse over the last few months and is now spreading up his neck to his face which is why he is presenting to the ED.  Patient states it is itchy and sometimes has a scaly appearance.  Patient has been using regular lotion without improvement.  Patient denies fever, chills, diaphoresis, chest pain, shortness of breath, abdominal pain, nausea, vomiting, joint pain, numbness, tingling, or weakness.        Review of Systems        Objective     ED Triage Vitals [09/17/24 1547]   Temperature Pulse Blood Pressure Respirations SpO2 Patient Position - Orthostatic VS   98.7 °F (37.1 °C) 79 140/80 18 99 % --      Temp src Heart Rate Source BP Location FiO2 (%) Pain Score    -- -- -- -- 3        Physical Exam  Constitutional:       General: He is not in acute distress.     Appearance: He is not ill-appearing, toxic-appearing or diaphoretic.   Cardiovascular:      Rate and Rhythm: Normal rate and regular rhythm.      Heart sounds: Normal heart sounds.   Pulmonary:      Effort: Pulmonary effort is  normal.      Breath sounds: Normal breath sounds.   Abdominal:      General: Abdomen is flat.      Palpations: Abdomen is soft.      Tenderness: There is no abdominal tenderness.   Musculoskeletal:         General: Normal range of motion.      Cervical back: Normal range of motion and neck supple.   Skin:     General: Skin is warm and dry.      Findings: Rash (Diffuse plaques on the bilateral upper extremities, lower extremities, neck, and face) present.   Neurological:      General: No focal deficit present.      Mental Status: He is alert and oriented to person, place, and time.         Labs Reviewed - No data to display  No orders to display       Procedures         Conor Subramanian MD  09/21/24 2030

## 2024-09-17 NOTE — Clinical Note
Trevon Delong was seen and treated in our emergency department on 9/17/2024.                Diagnosis:     Trevon  may return to work on return date.    He may return on this date: 09/17/2024         If you have any questions or concerns, please don't hesitate to call.      Conor Subramanian MD    ______________________________           _______________          _______________  Hospital Representative                              Date                                Time

## 2024-09-24 ENCOUNTER — TELEPHONE (OUTPATIENT)
Age: 34
End: 2024-09-24

## 2024-09-24 NOTE — TELEPHONE ENCOUNTER
Pt called to schedule for Consult with Referral for Rash. Verified with Clinical that he would need to be scheduled as next available.  Advised to pt, next available at the [place] office would be July/Aug 2025. Suggested to contact his pcp or ins for other derm groups that may have a better schedule at this time.